# Patient Record
Sex: FEMALE | Race: WHITE | URBAN - METROPOLITAN AREA
[De-identification: names, ages, dates, MRNs, and addresses within clinical notes are randomized per-mention and may not be internally consistent; named-entity substitution may affect disease eponyms.]

---

## 2020-06-15 ENCOUNTER — LAB OUTSIDE AN ENCOUNTER (OUTPATIENT)
Dept: URBAN - METROPOLITAN AREA CLINIC 98 | Facility: CLINIC | Age: 65
End: 2020-06-15

## 2020-06-23 ENCOUNTER — OFFICE VISIT (OUTPATIENT)
Dept: URBAN - METROPOLITAN AREA TELEHEALTH 2 | Facility: TELEHEALTH | Age: 65
End: 2020-06-23
Payer: MEDICARE

## 2020-06-23 ENCOUNTER — DASHBOARD ENCOUNTERS (OUTPATIENT)
Age: 65
End: 2020-06-23

## 2020-06-23 DIAGNOSIS — K74.60 CIRRHOSIS: ICD-10-CM

## 2020-06-23 DIAGNOSIS — I85.00 ESOPHAGEAL VARICES: ICD-10-CM

## 2020-06-23 DIAGNOSIS — K75.81 NASH WITH CIRRHOSIS: ICD-10-CM

## 2020-06-23 DIAGNOSIS — K74.69 CIRRHOSIS, CRYPTOGENIC: ICD-10-CM

## 2020-06-23 PROCEDURE — 1036F TOBACCO NON-USER: CPT | Performed by: INTERNAL MEDICINE

## 2020-06-23 PROCEDURE — 3017F COLORECTAL CA SCREEN DOC REV: CPT | Performed by: INTERNAL MEDICINE

## 2020-06-23 PROCEDURE — G8417 CALC BMI ABV UP PARAM F/U: HCPCS | Performed by: INTERNAL MEDICINE

## 2020-06-23 PROCEDURE — 99214 OFFICE O/P EST MOD 30 MIN: CPT | Performed by: INTERNAL MEDICINE

## 2020-06-23 PROCEDURE — G9903 PT SCRN TBCO ID AS NON USER: HCPCS | Performed by: INTERNAL MEDICINE

## 2020-06-23 PROCEDURE — G8427 DOCREV CUR MEDS BY ELIG CLIN: HCPCS | Performed by: INTERNAL MEDICINE

## 2020-06-23 RX ORDER — PANTOPRAZOLE SODIUM 40 MG
TAKE 1 TABLET (40 MG) BY ORAL ROUTE ONCE DAILY FOR 90 DAYS TABLET, DELAYED RELEASE (ENTERIC COATED) ORAL 1
Qty: 90 | Refills: 3 | Status: ACTIVE | COMMUNITY
Start: 2020-01-29 | End: 2021-01-23

## 2020-06-23 RX ORDER — LEVOTHYROXINE SODIUM 75 UG/1
TABLET ORAL
Qty: 0 | Refills: 0 | Status: ACTIVE | COMMUNITY
Start: 1900-01-01

## 2020-06-23 RX ORDER — METFORMIN HYDROCHLORIDE 500 MG/1
TAKE 2 TABLETS (1,000 MG) BY ORAL ROUTE ONCE DAILY WITH THE EVENING MEAL TABLET, EXTENDED RELEASE ORAL 1
Qty: 0 | Refills: 0 | Status: ACTIVE | COMMUNITY
Start: 1900-01-01

## 2020-06-23 RX ORDER — INSULIN GLARGINE 100 [IU]/ML
INJECTION, SOLUTION SUBCUTANEOUS
Qty: 0 | Refills: 0 | Status: ACTIVE | COMMUNITY
Start: 1900-01-01

## 2020-06-23 RX ORDER — SITAGLIPTIN 50 MG/1
TAKE 1 TABLET (50 MG) BY ORAL ROUTE ONCE DAILY TABLET, FILM COATED ORAL 1
Qty: 0 | Refills: 0 | Status: ACTIVE | COMMUNITY
Start: 1900-01-01

## 2020-06-23 RX ORDER — NADOLOL 40 MG/1
TAKE 1 TABLET BY MOUTH ONCE DAILY AT BEDTIME TABLET ORAL
Qty: 90 | Refills: 3 | Status: ACTIVE | COMMUNITY
Start: 2019-12-11

## 2020-06-23 RX ORDER — GLIMEPIRIDE 2 MG/1
TAKE 2 TABLETS (4 MG) BY ORAL ROUTE ONCE DAILY TABLET ORAL 1
Qty: 0 | Refills: 0 | Status: ACTIVE | COMMUNITY
Start: 1900-01-01

## 2020-06-23 NOTE — HPI-TODAY'S VISIT:
Here to f/u: routine cirrhosis faxed labs - still pending my review blood counts are pretty low  [place] concerned that she is needing to get more iron infusions on a more frequent basis  Hb 9.2 no overt bleeding

## 2020-07-15 ENCOUNTER — TELEPHONE ENCOUNTER (OUTPATIENT)
Dept: URBAN - METROPOLITAN AREA CLINIC 92 | Facility: CLINIC | Age: 65
End: 2020-07-15

## 2020-07-16 ENCOUNTER — OFFICE VISIT (OUTPATIENT)
Dept: URBAN - METROPOLITAN AREA MEDICAL CENTER 28 | Facility: MEDICAL CENTER | Age: 65
End: 2020-07-16
Payer: MEDICARE

## 2020-07-16 ENCOUNTER — LAB OUTSIDE AN ENCOUNTER (OUTPATIENT)
Dept: URBAN - METROPOLITAN AREA CLINIC 98 | Facility: CLINIC | Age: 65
End: 2020-07-16

## 2020-07-16 DIAGNOSIS — I85.10 ESOPH VARICE OTHER DIS: ICD-10-CM

## 2020-07-16 DIAGNOSIS — K31.7 BENIGN GASTRIC POLYP: ICD-10-CM

## 2020-07-16 DIAGNOSIS — K29.60 ADENOPAPILLOMATOSIS GASTRICA: ICD-10-CM

## 2020-07-16 DIAGNOSIS — K74.60 ADVANCED CIRRHOSIS OF LIVER: ICD-10-CM

## 2020-07-16 LAB
ABSOLUTE NRBC COUNT: 0
GLUCOSE POC: 210
HCT: 23.8
HGB: 7.2
IPF: 2.2
MCH: 25
MCHC: 30.3
MCV: 82.6
MPV: 9.9
NRBC AUTO: 0
PERFORMING LAB: (no result)
PERFORMING LAB: (no result)
PLATELETS: 83
RBC: 2.88
RDW: 17.1
WBC: 2.6

## 2020-07-16 PROCEDURE — 43251 EGD REMOVE LESION SNARE: CPT | Performed by: INTERNAL MEDICINE

## 2020-07-16 RX ORDER — GLIMEPIRIDE 2 MG/1
TAKE 2 TABLETS (4 MG) BY ORAL ROUTE ONCE DAILY TABLET ORAL 1
Qty: 0 | Refills: 0 | Status: ACTIVE | COMMUNITY
Start: 1900-01-01

## 2020-07-16 RX ORDER — INSULIN GLARGINE 100 [IU]/ML
INJECTION, SOLUTION SUBCUTANEOUS
Qty: 0 | Refills: 0 | Status: ACTIVE | COMMUNITY
Start: 1900-01-01

## 2020-07-16 RX ORDER — NADOLOL 40 MG/1
TAKE 1 TABLET BY MOUTH ONCE DAILY AT BEDTIME TABLET ORAL
Qty: 90 | Refills: 3 | Status: ACTIVE | COMMUNITY
Start: 2019-12-11

## 2020-07-16 RX ORDER — SITAGLIPTIN 50 MG/1
TAKE 1 TABLET (50 MG) BY ORAL ROUTE ONCE DAILY TABLET, FILM COATED ORAL 1
Qty: 0 | Refills: 0 | Status: ACTIVE | COMMUNITY
Start: 1900-01-01

## 2020-07-16 RX ORDER — LEVOTHYROXINE SODIUM 75 UG/1
TABLET ORAL
Qty: 0 | Refills: 0 | Status: ACTIVE | COMMUNITY
Start: 1900-01-01

## 2020-07-16 RX ORDER — METFORMIN HYDROCHLORIDE 500 MG/1
TAKE 2 TABLETS (1,000 MG) BY ORAL ROUTE ONCE DAILY WITH THE EVENING MEAL TABLET, EXTENDED RELEASE ORAL 1
Qty: 0 | Refills: 0 | Status: ACTIVE | COMMUNITY
Start: 1900-01-01

## 2020-07-16 RX ORDER — PANTOPRAZOLE SODIUM 40 MG
TAKE 1 TABLET (40 MG) BY ORAL ROUTE ONCE DAILY FOR 90 DAYS TABLET, DELAYED RELEASE (ENTERIC COATED) ORAL 1
Qty: 90 | Refills: 3 | Status: ACTIVE | COMMUNITY
Start: 2020-01-29 | End: 2021-01-23

## 2020-09-16 ENCOUNTER — ERX REFILL RESPONSE (OUTPATIENT)
Age: 65
End: 2020-09-16

## 2020-09-16 RX ORDER — NADOLOL 40 MG/1
TAKE 1 TABLET BY MOUTH ONCE DAILY AT BEDTIME TABLET ORAL
Qty: 90 | Refills: 3

## 2020-09-16 RX ORDER — PANTOPRAZOLE SODIUM 40 MG/1
TAKE 1 TABLET BY MOUTH TWICE A DAY TABLET, DELAYED RELEASE ORAL
Qty: 180 | Refills: 3

## 2020-09-17 ENCOUNTER — OFFICE VISIT (OUTPATIENT)
Dept: URBAN - METROPOLITAN AREA MEDICAL CENTER 28 | Facility: MEDICAL CENTER | Age: 65
End: 2020-09-17
Payer: MEDICARE

## 2020-09-17 ENCOUNTER — LAB OUTSIDE AN ENCOUNTER (OUTPATIENT)
Dept: URBAN - METROPOLITAN AREA CLINIC 98 | Facility: CLINIC | Age: 65
End: 2020-09-17

## 2020-09-17 ENCOUNTER — TELEPHONE ENCOUNTER (OUTPATIENT)
Dept: URBAN - METROPOLITAN AREA CLINIC 98 | Facility: CLINIC | Age: 65
End: 2020-09-17

## 2020-09-17 DIAGNOSIS — K31.89 ACQUIRED DEFORMITY OF DUODENUM: ICD-10-CM

## 2020-09-17 DIAGNOSIS — I85.10 ESOPH VARICE OTHER DIS: ICD-10-CM

## 2020-09-17 DIAGNOSIS — K74.60 ADVANCED CIRRHOSIS OF LIVER: ICD-10-CM

## 2020-09-17 LAB
GLUCOSE POC: 235
PERFORMING LAB: (no result)

## 2020-09-17 PROCEDURE — 43244 EGD VARICES LIGATION: CPT | Performed by: INTERNAL MEDICINE

## 2020-09-17 RX ORDER — NADOLOL 40 MG/1
TAKE 1 TABLET BY MOUTH ONCE DAILY AT BEDTIME TABLET ORAL
Qty: 90 | Refills: 3

## 2020-09-17 RX ORDER — INSULIN GLARGINE 100 [IU]/ML
INJECTION, SOLUTION SUBCUTANEOUS
Qty: 0 | Refills: 0 | Status: ACTIVE | COMMUNITY
Start: 1900-01-01

## 2020-09-17 RX ORDER — LEVOTHYROXINE SODIUM 75 UG/1
TABLET ORAL
Qty: 0 | Refills: 0 | Status: ACTIVE | COMMUNITY
Start: 1900-01-01

## 2020-09-17 RX ORDER — PANTOPRAZOLE SODIUM 40 MG
TAKE 1 TABLET (40 MG) BY ORAL ROUTE ONCE DAILY FOR 90 DAYS TABLET, DELAYED RELEASE (ENTERIC COATED) ORAL 1
Qty: 90 | Refills: 3 | Status: ACTIVE | COMMUNITY
Start: 2020-01-29 | End: 2021-01-23

## 2020-09-17 RX ORDER — NADOLOL 40 MG/1
1 TABLET TABLET ORAL ONCE A DAY
Qty: 90 | Refills: 3

## 2020-09-17 RX ORDER — PANTOPRAZOLE SODIUM 40 MG
1 TABLET TABLET, DELAYED RELEASE (ENTERIC COATED) ORAL ONCE A DAY
Qty: 90 | Refills: 3

## 2020-09-17 RX ORDER — NADOLOL 40 MG/1
TAKE 1 TABLET BY MOUTH ONCE DAILY AT BEDTIME TABLET ORAL
Qty: 90 | Refills: 3 | Status: ACTIVE | COMMUNITY

## 2020-09-17 RX ORDER — SITAGLIPTIN 50 MG/1
TAKE 1 TABLET (50 MG) BY ORAL ROUTE ONCE DAILY TABLET, FILM COATED ORAL 1
Qty: 0 | Refills: 0 | Status: ACTIVE | COMMUNITY
Start: 1900-01-01

## 2020-09-17 RX ORDER — PANTOPRAZOLE SODIUM 40 MG
TAKE 1 TABLET (40 MG) BY ORAL ROUTE ONCE DAILY FOR 90 DAYS TABLET, DELAYED RELEASE (ENTERIC COATED) ORAL 1
Qty: 90 | Refills: 3
Start: 2020-01-29

## 2020-09-17 RX ORDER — GLIMEPIRIDE 2 MG/1
TAKE 2 TABLETS (4 MG) BY ORAL ROUTE ONCE DAILY TABLET ORAL 1
Qty: 0 | Refills: 0 | Status: ACTIVE | COMMUNITY
Start: 1900-01-01

## 2020-09-17 RX ORDER — METFORMIN HYDROCHLORIDE 500 MG/1
TAKE 2 TABLETS (1,000 MG) BY ORAL ROUTE ONCE DAILY WITH THE EVENING MEAL TABLET, EXTENDED RELEASE ORAL 1
Qty: 0 | Refills: 0 | Status: ACTIVE | COMMUNITY
Start: 1900-01-01

## 2020-09-17 RX ORDER — PANTOPRAZOLE SODIUM 40 MG/1
TAKE 1 TABLET BY MOUTH TWICE A DAY TABLET, DELAYED RELEASE ORAL
Qty: 180 | Refills: 3 | Status: ACTIVE | COMMUNITY

## 2020-12-03 ENCOUNTER — LAB OUTSIDE AN ENCOUNTER (OUTPATIENT)
Dept: URBAN - METROPOLITAN AREA CLINIC 98 | Facility: CLINIC | Age: 65
End: 2020-12-03

## 2020-12-07 ENCOUNTER — WEB ENCOUNTER (OUTPATIENT)
Dept: URBAN - METROPOLITAN AREA CLINIC 98 | Facility: CLINIC | Age: 65
End: 2020-12-07

## 2020-12-17 ENCOUNTER — LAB OUTSIDE AN ENCOUNTER (OUTPATIENT)
Dept: URBAN - METROPOLITAN AREA CLINIC 98 | Facility: CLINIC | Age: 65
End: 2020-12-17

## 2020-12-23 ENCOUNTER — LAB OUTSIDE AN ENCOUNTER (OUTPATIENT)
Dept: URBAN - METROPOLITAN AREA TELEHEALTH 2 | Facility: TELEHEALTH | Age: 65
End: 2020-12-23

## 2021-01-11 ENCOUNTER — OFFICE VISIT (OUTPATIENT)
Dept: URBAN - METROPOLITAN AREA MEDICAL CENTER 28 | Facility: MEDICAL CENTER | Age: 66
End: 2021-01-11

## 2021-01-20 ENCOUNTER — TELEPHONE ENCOUNTER (OUTPATIENT)
Dept: URBAN - METROPOLITAN AREA CLINIC 98 | Facility: CLINIC | Age: 66
End: 2021-01-20

## 2021-01-22 ENCOUNTER — OFFICE VISIT (OUTPATIENT)
Dept: URBAN - METROPOLITAN AREA MEDICAL CENTER 28 | Facility: MEDICAL CENTER | Age: 66
End: 2021-01-22
Payer: MEDICARE

## 2021-01-22 ENCOUNTER — LAB OUTSIDE AN ENCOUNTER (OUTPATIENT)
Dept: URBAN - METROPOLITAN AREA CLINIC 98 | Facility: CLINIC | Age: 66
End: 2021-01-22

## 2021-01-22 ENCOUNTER — TELEPHONE ENCOUNTER (OUTPATIENT)
Dept: URBAN - METROPOLITAN AREA CLINIC 98 | Facility: CLINIC | Age: 66
End: 2021-01-22

## 2021-01-22 DIAGNOSIS — I85.10 ESOPH VARICE OTHER DIS: ICD-10-CM

## 2021-01-22 DIAGNOSIS — K74.60 ADVANCED CIRRHOSIS OF LIVER: ICD-10-CM

## 2021-01-22 DIAGNOSIS — K31.89 ACQUIRED DEFORMITY OF DUODENUM: ICD-10-CM

## 2021-01-22 LAB
ABSOLUTE BASOPHIL COUNT: 0.03
ABSOLUTE EOSINOPHIL COUNT: 0.09
ABSOLUTE IMMATURE GRANULOCYTE  COUNT: 0.02
ABSOLUTE LYMPHOCYTE COUNT: 0.66
ABSOLUTE MONOCYTE COUNT: 0.21
ABSOLUTE NEUTROPHIL COUNT (ANC): 1.93
ABSOLUTE NRBC  COUNT: 0
BASOPHIL AUTO: 1
EOS AUTO: 3
GLUCOSE POC: 207
HCT: 26.7
HGB: 8.3
IMMATURE GRANULOCYTES AUTO: 0.7
IPF: 3
LYMPH AUTO: 22
MCH: 25.9
MCHC: 31.1
MCV: 83.4
MONO AUTO: 7
MPV: 10.8
NEUTRO AUTO: 66
NRBC AUTO: 0
PERFORMING LAB: (no result)
PLATELETS: 81
RBC: 3.2
RDW: 15.5
SLIDE REVIEW: (no result)
WBC: 2.9

## 2021-01-22 PROCEDURE — 43244 EGD VARICES LIGATION: CPT | Performed by: INTERNAL MEDICINE

## 2021-01-22 RX ORDER — PANTOPRAZOLE SODIUM 40 MG
1 TABLET TABLET, DELAYED RELEASE (ENTERIC COATED) ORAL ONCE A DAY
Qty: 90 | Refills: 3 | Status: ACTIVE | COMMUNITY

## 2021-01-22 RX ORDER — METFORMIN HYDROCHLORIDE 500 MG/1
TAKE 2 TABLETS (1,000 MG) BY ORAL ROUTE ONCE DAILY WITH THE EVENING MEAL TABLET, EXTENDED RELEASE ORAL 1
Qty: 0 | Refills: 0 | Status: ACTIVE | COMMUNITY
Start: 1900-01-01

## 2021-01-22 RX ORDER — LEVOTHYROXINE SODIUM 75 UG/1
TABLET ORAL
Qty: 0 | Refills: 0 | Status: ACTIVE | COMMUNITY
Start: 1900-01-01

## 2021-01-22 RX ORDER — NADOLOL 40 MG/1
1 TABLET TABLET ORAL ONCE A DAY
Qty: 90 | Refills: 3 | Status: ACTIVE | COMMUNITY

## 2021-01-22 RX ORDER — GLIMEPIRIDE 2 MG/1
TAKE 2 TABLETS (4 MG) BY ORAL ROUTE ONCE DAILY TABLET ORAL 1
Qty: 0 | Refills: 0 | Status: ACTIVE | COMMUNITY
Start: 1900-01-01

## 2021-01-22 RX ORDER — INSULIN GLARGINE 100 [IU]/ML
INJECTION, SOLUTION SUBCUTANEOUS
Qty: 0 | Refills: 0 | Status: ACTIVE | COMMUNITY
Start: 1900-01-01

## 2021-01-22 RX ORDER — SITAGLIPTIN 50 MG/1
TAKE 1 TABLET (50 MG) BY ORAL ROUTE ONCE DAILY TABLET, FILM COATED ORAL 1
Qty: 0 | Refills: 0 | Status: ACTIVE | COMMUNITY
Start: 1900-01-01

## 2021-01-22 RX ORDER — PANTOPRAZOLE SODIUM 40 MG/1
TAKE 1 TABLET BY MOUTH TWICE A DAY TABLET, DELAYED RELEASE ORAL
Qty: 180 | Refills: 3 | Status: ACTIVE | COMMUNITY

## 2021-03-10 ENCOUNTER — TELEPHONE ENCOUNTER (OUTPATIENT)
Dept: URBAN - METROPOLITAN AREA CLINIC 98 | Facility: CLINIC | Age: 66
End: 2021-03-10

## 2021-04-22 ENCOUNTER — LAB OUTSIDE AN ENCOUNTER (OUTPATIENT)
Dept: URBAN - METROPOLITAN AREA CLINIC 98 | Facility: CLINIC | Age: 66
End: 2021-04-22

## 2021-04-23 ENCOUNTER — OFFICE VISIT (OUTPATIENT)
Dept: URBAN - METROPOLITAN AREA MEDICAL CENTER 28 | Facility: MEDICAL CENTER | Age: 66
End: 2021-04-23
Payer: MEDICARE

## 2021-04-23 ENCOUNTER — TELEPHONE ENCOUNTER (OUTPATIENT)
Dept: URBAN - METROPOLITAN AREA CLINIC 98 | Facility: CLINIC | Age: 66
End: 2021-04-23

## 2021-04-23 ENCOUNTER — LAB OUTSIDE AN ENCOUNTER (OUTPATIENT)
Dept: URBAN - METROPOLITAN AREA CLINIC 98 | Facility: CLINIC | Age: 66
End: 2021-04-23

## 2021-04-23 DIAGNOSIS — D50.0 ANEMIA DUE TO BLOOD LOSS: ICD-10-CM

## 2021-04-23 DIAGNOSIS — I85.10 ESOPH VARICE OTHER DIS: ICD-10-CM

## 2021-04-23 DIAGNOSIS — K74.60 ADVANCED CIRRHOSIS OF LIVER: ICD-10-CM

## 2021-04-23 DIAGNOSIS — K31.7 BENIGN GASTRIC POLYP: ICD-10-CM

## 2021-04-23 LAB
GLUCOSE POC: 201
PERFORMING LAB: (no result)

## 2021-04-23 PROCEDURE — 43251 EGD REMOVE LESION SNARE: CPT | Performed by: INTERNAL MEDICINE

## 2021-04-23 PROCEDURE — 43244 EGD VARICES LIGATION: CPT | Performed by: INTERNAL MEDICINE

## 2021-04-23 RX ORDER — PANTOPRAZOLE SODIUM 40 MG/1
TAKE 1 TABLET BY MOUTH TWICE A DAY TABLET, DELAYED RELEASE ORAL
Qty: 180 | Refills: 3 | Status: ACTIVE | COMMUNITY

## 2021-04-23 RX ORDER — PANTOPRAZOLE SODIUM 40 MG
1 TABLET TABLET, DELAYED RELEASE (ENTERIC COATED) ORAL ONCE A DAY
Qty: 90 | Refills: 3 | Status: ACTIVE | COMMUNITY

## 2021-04-23 RX ORDER — METFORMIN HYDROCHLORIDE 500 MG/1
TAKE 2 TABLETS (1,000 MG) BY ORAL ROUTE ONCE DAILY WITH THE EVENING MEAL TABLET, EXTENDED RELEASE ORAL 1
Qty: 0 | Refills: 0 | Status: ACTIVE | COMMUNITY
Start: 1900-01-01

## 2021-04-23 RX ORDER — SITAGLIPTIN 50 MG/1
TAKE 1 TABLET (50 MG) BY ORAL ROUTE ONCE DAILY TABLET, FILM COATED ORAL 1
Qty: 0 | Refills: 0 | Status: ACTIVE | COMMUNITY
Start: 1900-01-01

## 2021-04-23 RX ORDER — GLIMEPIRIDE 2 MG/1
TAKE 2 TABLETS (4 MG) BY ORAL ROUTE ONCE DAILY TABLET ORAL 1
Qty: 0 | Refills: 0 | Status: ACTIVE | COMMUNITY
Start: 1900-01-01

## 2021-04-23 RX ORDER — NADOLOL 40 MG/1
1 TABLET TABLET ORAL ONCE A DAY
Qty: 90 | Refills: 3 | Status: ACTIVE | COMMUNITY

## 2021-04-23 RX ORDER — LEVOTHYROXINE SODIUM 75 UG/1
TABLET ORAL
Qty: 0 | Refills: 0 | Status: ACTIVE | COMMUNITY
Start: 1900-01-01

## 2021-04-23 RX ORDER — INSULIN GLARGINE 100 [IU]/ML
INJECTION, SOLUTION SUBCUTANEOUS
Qty: 0 | Refills: 0 | Status: ACTIVE | COMMUNITY
Start: 1900-01-01

## 2021-04-29 ENCOUNTER — WEB ENCOUNTER (OUTPATIENT)
Dept: URBAN - METROPOLITAN AREA CLINIC 98 | Facility: CLINIC | Age: 66
End: 2021-04-29

## 2021-05-04 ENCOUNTER — OFFICE VISIT (OUTPATIENT)
Dept: URBAN - METROPOLITAN AREA TELEHEALTH 2 | Facility: TELEHEALTH | Age: 66
End: 2021-05-04

## 2021-06-17 ENCOUNTER — LAB OUTSIDE AN ENCOUNTER (OUTPATIENT)
Dept: URBAN - METROPOLITAN AREA CLINIC 98 | Facility: CLINIC | Age: 66
End: 2021-06-17

## 2021-06-18 ENCOUNTER — WEB ENCOUNTER (OUTPATIENT)
Dept: URBAN - METROPOLITAN AREA CLINIC 98 | Facility: CLINIC | Age: 66
End: 2021-06-18

## 2021-07-19 ENCOUNTER — TELEPHONE ENCOUNTER (OUTPATIENT)
Dept: URBAN - METROPOLITAN AREA CLINIC 98 | Facility: CLINIC | Age: 66
End: 2021-07-19

## 2021-07-23 ENCOUNTER — LAB OUTSIDE AN ENCOUNTER (OUTPATIENT)
Dept: URBAN - METROPOLITAN AREA CLINIC 98 | Facility: CLINIC | Age: 66
End: 2021-07-23

## 2021-07-23 ENCOUNTER — OFFICE VISIT (OUTPATIENT)
Dept: URBAN - METROPOLITAN AREA MEDICAL CENTER 28 | Facility: MEDICAL CENTER | Age: 66
End: 2021-07-23
Payer: MEDICARE

## 2021-07-23 DIAGNOSIS — I85.10 ESOPH VARICE OTHER DIS: ICD-10-CM

## 2021-07-23 DIAGNOSIS — K76.6 CLINICALLY SIGNIFICANT PORTAL HYPERTENSION: ICD-10-CM

## 2021-07-23 DIAGNOSIS — D50.9 ANEMIA, IRON DEFICIENCY: ICD-10-CM

## 2021-07-23 DIAGNOSIS — K74.60 ADVANCED CIRRHOSIS OF LIVER: ICD-10-CM

## 2021-07-23 LAB
GLUCOSE POC: 176
PERFORMING LAB: (no result)

## 2021-07-23 PROCEDURE — 43244 EGD VARICES LIGATION: CPT | Performed by: INTERNAL MEDICINE

## 2021-08-19 ENCOUNTER — OFFICE VISIT (OUTPATIENT)
Dept: URBAN - METROPOLITAN AREA MEDICAL CENTER 28 | Facility: MEDICAL CENTER | Age: 66
End: 2021-08-19

## 2021-08-23 ENCOUNTER — LAB OUTSIDE AN ENCOUNTER (OUTPATIENT)
Dept: URBAN - METROPOLITAN AREA CLINIC 98 | Facility: CLINIC | Age: 66
End: 2021-08-23

## 2021-09-24 ENCOUNTER — LAB OUTSIDE AN ENCOUNTER (OUTPATIENT)
Dept: URBAN - METROPOLITAN AREA CLINIC 98 | Facility: CLINIC | Age: 66
End: 2021-09-24

## 2021-09-24 ENCOUNTER — TELEPHONE ENCOUNTER (OUTPATIENT)
Dept: URBAN - METROPOLITAN AREA CLINIC 98 | Facility: CLINIC | Age: 66
End: 2021-09-24

## 2021-09-24 ENCOUNTER — OFFICE VISIT (OUTPATIENT)
Dept: URBAN - METROPOLITAN AREA MEDICAL CENTER 28 | Facility: MEDICAL CENTER | Age: 66
End: 2021-09-24
Payer: MEDICARE

## 2021-09-24 DIAGNOSIS — D50.9 ANEMIA: ICD-10-CM

## 2021-09-24 DIAGNOSIS — K31.89 ACQUIRED DEFORMITY OF DUODENUM: ICD-10-CM

## 2021-09-24 DIAGNOSIS — T18.2XXA FOREIGN BODY IN STOMACH: ICD-10-CM

## 2021-09-24 LAB
GLUCOSE POC: 203
GLUCOSE POC: 240
PERFORMING LAB: (no result)
PERFORMING LAB: (no result)

## 2021-09-24 PROCEDURE — 43251 EGD REMOVE LESION SNARE: CPT | Performed by: INTERNAL MEDICINE

## 2021-09-24 RX ORDER — GLIMEPIRIDE 2 MG/1
TAKE 2 TABLETS (4 MG) BY ORAL ROUTE ONCE DAILY TABLET ORAL 1
Qty: 0 | Refills: 0 | Status: ACTIVE | COMMUNITY
Start: 1900-01-01

## 2021-09-24 RX ORDER — INSULIN GLARGINE 100 [IU]/ML
INJECTION, SOLUTION SUBCUTANEOUS
Qty: 0 | Refills: 0 | Status: ACTIVE | COMMUNITY
Start: 1900-01-01

## 2021-09-24 RX ORDER — METFORMIN HYDROCHLORIDE 500 MG/1
TAKE 2 TABLETS (1,000 MG) BY ORAL ROUTE ONCE DAILY WITH THE EVENING MEAL TABLET, EXTENDED RELEASE ORAL 1
Qty: 0 | Refills: 0 | Status: ACTIVE | COMMUNITY
Start: 1900-01-01

## 2021-09-24 RX ORDER — SITAGLIPTIN 50 MG/1
TAKE 1 TABLET (50 MG) BY ORAL ROUTE ONCE DAILY TABLET, FILM COATED ORAL 1
Qty: 0 | Refills: 0 | Status: ACTIVE | COMMUNITY
Start: 1900-01-01

## 2021-09-24 RX ORDER — LEVOTHYROXINE SODIUM 75 UG/1
TABLET ORAL
Qty: 0 | Refills: 0 | Status: ACTIVE | COMMUNITY
Start: 1900-01-01

## 2021-09-24 RX ORDER — PANTOPRAZOLE SODIUM 40 MG/1
TAKE 1 TABLET BY MOUTH TWICE A DAY TABLET, DELAYED RELEASE ORAL
Qty: 180 | Refills: 3 | Status: ACTIVE | COMMUNITY

## 2021-09-24 RX ORDER — NADOLOL 40 MG/1
1 TABLET TABLET ORAL ONCE A DAY
Qty: 90 | Refills: 3 | Status: ACTIVE | COMMUNITY

## 2021-09-24 RX ORDER — PANTOPRAZOLE SODIUM 40 MG
1 TABLET TABLET, DELAYED RELEASE (ENTERIC COATED) ORAL ONCE A DAY
Qty: 90 | Refills: 3 | Status: ACTIVE | COMMUNITY

## 2021-09-29 ENCOUNTER — WEB ENCOUNTER (OUTPATIENT)
Dept: URBAN - METROPOLITAN AREA CLINIC 98 | Facility: CLINIC | Age: 66
End: 2021-09-29

## 2021-10-21 ENCOUNTER — ERX REFILL RESPONSE (OUTPATIENT)
Dept: URBAN - METROPOLITAN AREA CLINIC 98 | Facility: CLINIC | Age: 66
End: 2021-10-21

## 2021-10-21 RX ORDER — NADOLOL 40 MG/1
TAKE 1 TABLET BY MOUTH EVERY DAY TABLET ORAL
Qty: 90 TABLET | Refills: 3 | OUTPATIENT

## 2021-10-21 RX ORDER — PANTOPRAZOLE SODIUM 40 MG/1
TAKE 1 TABLET BY MOUTH TWICE A DAY TABLET, DELAYED RELEASE ORAL
Qty: 180 | Refills: 3 | OUTPATIENT

## 2021-10-21 RX ORDER — PANTOPRAZOLE SODIUM 40 MG/1
TAKE 1 TABLET BY MOUTH TWICE A DAY TABLET, DELAYED RELEASE ORAL
Qty: 180 TABLET | Refills: 3 | OUTPATIENT

## 2021-12-16 ENCOUNTER — TELEPHONE ENCOUNTER (OUTPATIENT)
Dept: URBAN - METROPOLITAN AREA CLINIC 98 | Facility: CLINIC | Age: 66
End: 2021-12-16

## 2021-12-16 ENCOUNTER — LAB OUTSIDE AN ENCOUNTER (OUTPATIENT)
Dept: URBAN - METROPOLITAN AREA CLINIC 98 | Facility: CLINIC | Age: 66
End: 2021-12-16

## 2021-12-16 ENCOUNTER — OFFICE VISIT (OUTPATIENT)
Dept: URBAN - METROPOLITAN AREA MEDICAL CENTER 28 | Facility: MEDICAL CENTER | Age: 66
End: 2021-12-16
Payer: MEDICARE

## 2021-12-16 DIAGNOSIS — K76.6 CLINICALLY SIGNIFICANT PORTAL HYPERTENSION: ICD-10-CM

## 2021-12-16 DIAGNOSIS — K31.7 BENIGN GASTRIC POLYP: ICD-10-CM

## 2021-12-16 DIAGNOSIS — I85.10 ESOPH VARICE OTHER DIS: ICD-10-CM

## 2021-12-16 DIAGNOSIS — D50.9 ANEMIA: ICD-10-CM

## 2021-12-16 LAB
GLUCOSE POC: 200
PERFORMING LAB: (no result)

## 2021-12-16 PROCEDURE — 43251 EGD REMOVE LESION SNARE: CPT | Performed by: INTERNAL MEDICINE

## 2021-12-16 RX ORDER — PANTOPRAZOLE SODIUM 40 MG
1 TABLET TABLET, DELAYED RELEASE (ENTERIC COATED) ORAL ONCE A DAY
Qty: 90 | Refills: 3 | Status: ACTIVE | COMMUNITY

## 2021-12-16 RX ORDER — NADOLOL 40 MG/1
TAKE 1 TABLET BY MOUTH EVERY DAY TABLET ORAL
Qty: 90 TABLET | Refills: 3 | Status: ACTIVE | COMMUNITY

## 2021-12-16 RX ORDER — LEVOTHYROXINE SODIUM 75 UG/1
TABLET ORAL
Qty: 0 | Refills: 0 | Status: ACTIVE | COMMUNITY
Start: 1900-01-01

## 2021-12-16 RX ORDER — PANTOPRAZOLE SODIUM 40 MG/1
TAKE 1 TABLET BY MOUTH TWICE A DAY TABLET, DELAYED RELEASE ORAL
Qty: 180 TABLET | Refills: 3 | Status: ACTIVE | COMMUNITY

## 2021-12-16 RX ORDER — INSULIN GLARGINE 100 [IU]/ML
INJECTION, SOLUTION SUBCUTANEOUS
Qty: 0 | Refills: 0 | Status: ACTIVE | COMMUNITY
Start: 1900-01-01

## 2021-12-16 RX ORDER — METFORMIN HYDROCHLORIDE 500 MG/1
TAKE 2 TABLETS (1,000 MG) BY ORAL ROUTE ONCE DAILY WITH THE EVENING MEAL TABLET, EXTENDED RELEASE ORAL 1
Qty: 0 | Refills: 0 | Status: ACTIVE | COMMUNITY
Start: 1900-01-01

## 2021-12-16 RX ORDER — SITAGLIPTIN 50 MG/1
TAKE 1 TABLET (50 MG) BY ORAL ROUTE ONCE DAILY TABLET, FILM COATED ORAL 1
Qty: 0 | Refills: 0 | Status: ACTIVE | COMMUNITY
Start: 1900-01-01

## 2021-12-16 RX ORDER — GLIMEPIRIDE 2 MG/1
TAKE 2 TABLETS (4 MG) BY ORAL ROUTE ONCE DAILY TABLET ORAL 1
Qty: 0 | Refills: 0 | Status: ACTIVE | COMMUNITY
Start: 1900-01-01

## 2021-12-23 ENCOUNTER — LAB OUTSIDE AN ENCOUNTER (OUTPATIENT)
Dept: URBAN - METROPOLITAN AREA CLINIC 98 | Facility: CLINIC | Age: 66
End: 2021-12-23

## 2021-12-24 ENCOUNTER — LAB OUTSIDE AN ENCOUNTER (OUTPATIENT)
Dept: URBAN - METROPOLITAN AREA CLINIC 98 | Facility: CLINIC | Age: 66
End: 2021-12-24

## 2021-12-28 ENCOUNTER — WEB ENCOUNTER (OUTPATIENT)
Dept: URBAN - METROPOLITAN AREA CLINIC 98 | Facility: CLINIC | Age: 66
End: 2021-12-28

## 2022-03-16 ENCOUNTER — LAB OUTSIDE AN ENCOUNTER (OUTPATIENT)
Dept: URBAN - METROPOLITAN AREA CLINIC 98 | Facility: CLINIC | Age: 67
End: 2022-03-16

## 2022-03-25 ENCOUNTER — LAB OUTSIDE AN ENCOUNTER (OUTPATIENT)
Dept: URBAN - METROPOLITAN AREA CLINIC 98 | Facility: CLINIC | Age: 67
End: 2022-03-25

## 2022-03-25 ENCOUNTER — TELEPHONE ENCOUNTER (OUTPATIENT)
Dept: URBAN - METROPOLITAN AREA CLINIC 98 | Facility: CLINIC | Age: 67
End: 2022-03-25

## 2022-03-25 ENCOUNTER — OFFICE VISIT (OUTPATIENT)
Dept: URBAN - METROPOLITAN AREA MEDICAL CENTER 28 | Facility: MEDICAL CENTER | Age: 67
End: 2022-03-25
Payer: MEDICARE

## 2022-03-25 DIAGNOSIS — I85.10 ESOPH VARICE OTHER DIS: ICD-10-CM

## 2022-03-25 DIAGNOSIS — K31.89 ACQUIRED DEFORMITY OF DUODENUM: ICD-10-CM

## 2022-03-25 DIAGNOSIS — K74.60 ADVANCED CIRRHOSIS: ICD-10-CM

## 2022-03-25 LAB
ABSOLUTE BASOPHIL COUNT: 0.05
ABSOLUTE EOSINOPHIL COUNT: 0.15
ABSOLUTE IMMATURE GRANULOCYTE  COUNT: 0.03
ABSOLUTE LYMPHOCYTE COUNT: 0.58
ABSOLUTE MONOCYTE COUNT: 0.41
ABSOLUTE NEUTROPHIL COUNT (ANC): 2.81
ABSOLUTE NRBC  COUNT: 0
AG RATIO: 1
ALBUMIN LEVEL: 3.5
ALK PHOS: 168
ALT: 23
ANION GAP: 10
AST: 38
BASOPHIL AUTO: 1
BILIRUBIN TOTAL: 2.6
BUN/CREAT RATIO: 12
BUN: 11
CALCIUM LEVEL: 8.4
CHLORIDE LEVEL: 110
CO2 LEVEL: 22
CREATININE LEVEL: 0.9
EOS AUTO: 4
FERRITIN LEVEL: 265.9
GFR AFRICAN AMERICAN: >60
GFR NON AFRICAN AMERICAN: >60
GLUCOSE LEVEL: 181
GLUCOSE POC: 172
HCT: 31.5
HGB: 10.2
IMMATURE GRANULOCYTES AUTO: 0.7
INR: 1.4
IPF: 3
IRON LEVEL: 39
IRON SAT: 15
LYMPH AUTO: 14
MCH: 30
MCHC: 32.4
MCV: 92.6
MONO AUTO: 10
MPV: 10.2
NEUTRO AUTO: 70
NRBC AUTO: 0
OSMO (CALC): 287
PERFORMING LAB: (no result)
PLATELETS: 92
POTASSIUM LEVEL: 3.9
PROTEIN TOTAL: 7.1
PT: 16.1
RBC: 3.4
RDW: 17.8
SLIDE REVIEW: (no result)
SODIUM LEVEL: 142
TIBC: 258
WBC: 4

## 2022-03-25 PROCEDURE — 43244 EGD VARICES LIGATION: CPT | Performed by: INTERNAL MEDICINE

## 2022-03-25 PROCEDURE — 43239 EGD BIOPSY SINGLE/MULTIPLE: CPT | Performed by: INTERNAL MEDICINE

## 2022-03-25 RX ORDER — PANTOPRAZOLE SODIUM 40 MG/1
TAKE 1 TABLET BY MOUTH TWICE A DAY TABLET, DELAYED RELEASE ORAL
Qty: 180 TABLET | Refills: 3 | Status: ACTIVE | COMMUNITY

## 2022-03-25 RX ORDER — LEVOTHYROXINE SODIUM 75 UG/1
TABLET ORAL
Qty: 0 | Refills: 0 | Status: ACTIVE | COMMUNITY
Start: 1900-01-01

## 2022-03-25 RX ORDER — NADOLOL 40 MG/1
TAKE 1 TABLET BY MOUTH EVERY DAY TABLET ORAL
Qty: 90 TABLET | Refills: 3 | Status: ACTIVE | COMMUNITY

## 2022-03-25 RX ORDER — PANTOPRAZOLE SODIUM 40 MG
1 TABLET TABLET, DELAYED RELEASE (ENTERIC COATED) ORAL ONCE A DAY
Qty: 90 | Refills: 3 | Status: ACTIVE | COMMUNITY

## 2022-03-25 RX ORDER — METFORMIN HYDROCHLORIDE 500 MG/1
TAKE 2 TABLETS (1,000 MG) BY ORAL ROUTE ONCE DAILY WITH THE EVENING MEAL TABLET, EXTENDED RELEASE ORAL 1
Qty: 0 | Refills: 0 | Status: ACTIVE | COMMUNITY
Start: 1900-01-01

## 2022-03-25 RX ORDER — INSULIN GLARGINE 100 [IU]/ML
INJECTION, SOLUTION SUBCUTANEOUS
Qty: 0 | Refills: 0 | Status: ACTIVE | COMMUNITY
Start: 1900-01-01

## 2022-03-25 RX ORDER — SITAGLIPTIN 50 MG/1
TAKE 1 TABLET (50 MG) BY ORAL ROUTE ONCE DAILY TABLET, FILM COATED ORAL 1
Qty: 0 | Refills: 0 | Status: ACTIVE | COMMUNITY
Start: 1900-01-01

## 2022-03-25 RX ORDER — GLIMEPIRIDE 2 MG/1
TAKE 2 TABLETS (4 MG) BY ORAL ROUTE ONCE DAILY TABLET ORAL 1
Qty: 0 | Refills: 0 | Status: ACTIVE | COMMUNITY
Start: 1900-01-01

## 2022-03-28 ENCOUNTER — TELEPHONE ENCOUNTER (OUTPATIENT)
Dept: URBAN - METROPOLITAN AREA CLINIC 97 | Facility: CLINIC | Age: 67
End: 2022-03-28

## 2022-06-06 ENCOUNTER — LAB OUTSIDE AN ENCOUNTER (OUTPATIENT)
Dept: URBAN - METROPOLITAN AREA CLINIC 98 | Facility: CLINIC | Age: 67
End: 2022-06-06

## 2022-06-09 ENCOUNTER — LAB OUTSIDE AN ENCOUNTER (OUTPATIENT)
Dept: URBAN - METROPOLITAN AREA CLINIC 98 | Facility: CLINIC | Age: 67
End: 2022-06-09

## 2022-06-09 LAB
CREATININE POC: 0.6
PERFORMING LAB: (no result)

## 2022-06-10 ENCOUNTER — WEB ENCOUNTER (OUTPATIENT)
Dept: URBAN - METROPOLITAN AREA CLINIC 98 | Facility: CLINIC | Age: 67
End: 2022-06-10

## 2022-06-20 ENCOUNTER — LAB OUTSIDE AN ENCOUNTER (OUTPATIENT)
Dept: URBAN - METROPOLITAN AREA CLINIC 98 | Facility: CLINIC | Age: 67
End: 2022-06-20

## 2022-06-25 ENCOUNTER — LAB OUTSIDE AN ENCOUNTER (OUTPATIENT)
Dept: URBAN - METROPOLITAN AREA CLINIC 98 | Facility: CLINIC | Age: 67
End: 2022-06-25

## 2022-06-30 ENCOUNTER — ERX REFILL RESPONSE (OUTPATIENT)
Dept: URBAN - METROPOLITAN AREA CLINIC 98 | Facility: CLINIC | Age: 67
End: 2022-06-30

## 2022-06-30 RX ORDER — NADOLOL 40 MG/1
TAKE 1 TABLET BY MOUTH EVERY DAY TABLET ORAL
Qty: 90 TABLET | Refills: 1 | OUTPATIENT

## 2022-06-30 RX ORDER — PANTOPRAZOLE SODIUM 40 MG/1
TAKE 1 TABLET BY MOUTH TWICE A DAY TABLET, DELAYED RELEASE ORAL
Qty: 180 TABLET | Refills: 3 | OUTPATIENT

## 2022-06-30 RX ORDER — NADOLOL 40 MG/1
TAKE 1 TABLET BY MOUTH EVERY DAY TABLET ORAL
Qty: 90 TABLET | Refills: 3 | OUTPATIENT

## 2022-06-30 RX ORDER — PANTOPRAZOLE SODIUM 40 MG/1
TAKE 1 TABLET BY MOUTH TWICE A DAY TABLET, DELAYED RELEASE ORAL
Qty: 180 TABLET | Refills: 1 | OUTPATIENT

## 2022-07-22 ENCOUNTER — LAB OUTSIDE AN ENCOUNTER (OUTPATIENT)
Dept: URBAN - METROPOLITAN AREA CLINIC 98 | Facility: CLINIC | Age: 67
End: 2022-07-22

## 2022-07-22 ENCOUNTER — OFFICE VISIT (OUTPATIENT)
Dept: URBAN - METROPOLITAN AREA MEDICAL CENTER 28 | Facility: MEDICAL CENTER | Age: 67
End: 2022-07-22
Payer: MEDICARE

## 2022-07-22 DIAGNOSIS — K74.60 ADVANCED CIRRHOSIS: ICD-10-CM

## 2022-07-22 DIAGNOSIS — D50.0 ANEMIA: ICD-10-CM

## 2022-07-22 DIAGNOSIS — K31.7 BENIGN GASTRIC POLYP: ICD-10-CM

## 2022-07-22 DIAGNOSIS — I85.10 ESOPH VARICE OTHER DIS: ICD-10-CM

## 2022-07-22 LAB
GLUCOSE POC: 129
PERFORMING LAB: (no result)

## 2022-07-22 PROCEDURE — 43251 EGD REMOVE LESION SNARE: CPT | Performed by: INTERNAL MEDICINE

## 2022-07-22 RX ORDER — PANTOPRAZOLE SODIUM 40 MG
1 TABLET TABLET, DELAYED RELEASE (ENTERIC COATED) ORAL ONCE A DAY
Qty: 90 | Refills: 3 | Status: ACTIVE | COMMUNITY

## 2022-07-22 RX ORDER — PANTOPRAZOLE SODIUM 40 MG/1
TAKE 1 TABLET BY MOUTH TWICE A DAY TABLET, DELAYED RELEASE ORAL
Qty: 180 TABLET | Refills: 1 | Status: ACTIVE | COMMUNITY

## 2022-07-22 RX ORDER — SITAGLIPTIN 50 MG/1
TAKE 1 TABLET (50 MG) BY ORAL ROUTE ONCE DAILY TABLET, FILM COATED ORAL 1
Qty: 0 | Refills: 0 | Status: ACTIVE | COMMUNITY
Start: 1900-01-01

## 2022-07-22 RX ORDER — GLIMEPIRIDE 2 MG/1
TAKE 2 TABLETS (4 MG) BY ORAL ROUTE ONCE DAILY TABLET ORAL 1
Qty: 0 | Refills: 0 | Status: ACTIVE | COMMUNITY
Start: 1900-01-01

## 2022-07-22 RX ORDER — LEVOTHYROXINE SODIUM 75 UG/1
TABLET ORAL
Qty: 0 | Refills: 0 | Status: ACTIVE | COMMUNITY
Start: 1900-01-01

## 2022-07-22 RX ORDER — NADOLOL 40 MG/1
TAKE 1 TABLET BY MOUTH EVERY DAY TABLET ORAL
Qty: 90 TABLET | Refills: 1 | Status: ACTIVE | COMMUNITY

## 2022-07-22 RX ORDER — METFORMIN HYDROCHLORIDE 500 MG/1
TAKE 2 TABLETS (1,000 MG) BY ORAL ROUTE ONCE DAILY WITH THE EVENING MEAL TABLET, EXTENDED RELEASE ORAL 1
Qty: 0 | Refills: 0 | Status: ACTIVE | COMMUNITY
Start: 1900-01-01

## 2022-07-22 RX ORDER — INSULIN GLARGINE 100 [IU]/ML
INJECTION, SOLUTION SUBCUTANEOUS
Qty: 0 | Refills: 0 | Status: ACTIVE | COMMUNITY
Start: 1900-01-01

## 2022-07-26 ENCOUNTER — TELEPHONE ENCOUNTER (OUTPATIENT)
Dept: URBAN - METROPOLITAN AREA CLINIC 98 | Facility: CLINIC | Age: 67
End: 2022-07-26

## 2022-07-26 RX ORDER — INSULIN GLARGINE 100 [IU]/ML
INJECTION, SOLUTION SUBCUTANEOUS
Qty: 0 | Refills: 0 | Status: ACTIVE | COMMUNITY
Start: 1900-01-01

## 2022-07-26 RX ORDER — PANTOPRAZOLE SODIUM 40 MG/1
TAKE 1 TABLET BY MOUTH TWICE A DAY TABLET, DELAYED RELEASE ORAL
Qty: 180 TABLET | Refills: 1 | Status: ACTIVE | COMMUNITY

## 2022-07-26 RX ORDER — LEVOTHYROXINE SODIUM 75 UG/1
TABLET ORAL
Qty: 0 | Refills: 0 | Status: ACTIVE | COMMUNITY
Start: 1900-01-01

## 2022-07-26 RX ORDER — SITAGLIPTIN 50 MG/1
TAKE 1 TABLET (50 MG) BY ORAL ROUTE ONCE DAILY TABLET, FILM COATED ORAL 1
Qty: 0 | Refills: 0 | Status: ACTIVE | COMMUNITY
Start: 1900-01-01

## 2022-07-26 RX ORDER — PANTOPRAZOLE SODIUM 40 MG
1 TABLET TABLET, DELAYED RELEASE (ENTERIC COATED) ORAL ONCE A DAY
Qty: 90 | Refills: 3 | Status: ACTIVE | COMMUNITY

## 2022-07-26 RX ORDER — METFORMIN HYDROCHLORIDE 500 MG/1
TAKE 2 TABLETS (1,000 MG) BY ORAL ROUTE ONCE DAILY WITH THE EVENING MEAL TABLET, EXTENDED RELEASE ORAL 1
Qty: 0 | Refills: 0 | Status: ACTIVE | COMMUNITY
Start: 1900-01-01

## 2022-07-26 RX ORDER — NADOLOL 40 MG/1
TAKE 1 TABLET BY MOUTH EVERY DAY TABLET ORAL
Qty: 90 TABLET | Refills: 1 | Status: ACTIVE | COMMUNITY

## 2022-07-26 RX ORDER — GLIMEPIRIDE 2 MG/1
TAKE 2 TABLETS (4 MG) BY ORAL ROUTE ONCE DAILY TABLET ORAL 1
Qty: 0 | Refills: 0 | Status: ACTIVE | COMMUNITY
Start: 1900-01-01

## 2022-07-26 RX ORDER — SODIUM, POTASSIUM,MAG SULFATES 17.5-3.13G
177ML SOLUTION, RECONSTITUTED, ORAL ORAL AS DIRECTED
Qty: 1 KIT | Refills: 0 | OUTPATIENT
Start: 2022-07-29 | End: 2022-07-30

## 2022-07-27 ENCOUNTER — WEB ENCOUNTER (OUTPATIENT)
Dept: URBAN - METROPOLITAN AREA CLINIC 98 | Facility: CLINIC | Age: 67
End: 2022-07-27

## 2022-07-29 PROBLEM — 428283002: Status: ACTIVE | Noted: 2022-07-29

## 2022-07-31 PROBLEM — 255417007 CIRRHOTIC: Status: ACTIVE | Noted: 2020-06-21

## 2022-07-31 PROBLEM — 17709002 ESOPHAGEAL VARICES WITH BLEEDING: Status: ACTIVE | Noted: 2020-06-21

## 2022-11-08 ENCOUNTER — LAB OUTSIDE AN ENCOUNTER (OUTPATIENT)
Dept: URBAN - METROPOLITAN AREA CLINIC 98 | Facility: CLINIC | Age: 67
End: 2022-11-08

## 2022-11-08 ENCOUNTER — TELEPHONE ENCOUNTER (OUTPATIENT)
Dept: URBAN - METROPOLITAN AREA CLINIC 98 | Facility: CLINIC | Age: 67
End: 2022-11-08

## 2022-11-08 PROBLEM — 408512008 BODY MASS INDEX 40+ - MORBIDLY OBESE: Status: ACTIVE | Noted: 2020-06-23

## 2022-11-17 ENCOUNTER — OFFICE VISIT (OUTPATIENT)
Dept: URBAN - METROPOLITAN AREA MEDICAL CENTER 28 | Facility: MEDICAL CENTER | Age: 67
End: 2022-11-17
Payer: MEDICARE

## 2022-11-17 ENCOUNTER — LAB OUTSIDE AN ENCOUNTER (OUTPATIENT)
Dept: URBAN - METROPOLITAN AREA CLINIC 98 | Facility: CLINIC | Age: 67
End: 2022-11-17

## 2022-11-17 DIAGNOSIS — I85.10 ESOPH VARICE OTHER DIS: ICD-10-CM

## 2022-11-17 DIAGNOSIS — D12.3 ADENOMA OF TRANSVERSE COLON: ICD-10-CM

## 2022-11-17 DIAGNOSIS — K31.7 BENIGN GASTRIC POLYP: ICD-10-CM

## 2022-11-17 DIAGNOSIS — D50.9 ANEMIA: ICD-10-CM

## 2022-11-17 DIAGNOSIS — K74.60 ADVANCED CIRRHOSIS: ICD-10-CM

## 2022-11-17 DIAGNOSIS — Z86.010 ADENOMAS PERSONAL HISTORY OF COLONIC POLYPS: ICD-10-CM

## 2022-11-17 DIAGNOSIS — K62.1 ANAL AND RECTAL POLYP: ICD-10-CM

## 2022-11-17 LAB
GLUCOSE POC: 111
PERFORMING LAB: (no result)

## 2022-11-17 PROCEDURE — 43251 EGD REMOVE LESION SNARE: CPT | Performed by: INTERNAL MEDICINE

## 2022-11-17 PROCEDURE — 45380 COLONOSCOPY AND BIOPSY: CPT | Performed by: INTERNAL MEDICINE

## 2022-11-26 ENCOUNTER — LAB OUTSIDE AN ENCOUNTER (OUTPATIENT)
Dept: URBAN - METROPOLITAN AREA CLINIC 98 | Facility: CLINIC | Age: 67
End: 2022-11-26

## 2022-12-01 ENCOUNTER — TELEPHONE ENCOUNTER (OUTPATIENT)
Dept: URBAN - METROPOLITAN AREA CLINIC 98 | Facility: CLINIC | Age: 67
End: 2022-12-01

## 2022-12-01 RX ORDER — PANTOPRAZOLE SODIUM 40 MG/1
TAKE 1 TABLET BY MOUTH TWICE A DAY TABLET, DELAYED RELEASE ORAL
Qty: 180 TABLET | Refills: 3

## 2022-12-01 RX ORDER — NADOLOL 40 MG/1
TAKE 1 TABLET BY MOUTH EVERY DAY TABLET ORAL
Qty: 90 TABLET | Refills: 3

## 2022-12-02 ENCOUNTER — LAB OUTSIDE AN ENCOUNTER (OUTPATIENT)
Dept: URBAN - METROPOLITAN AREA CLINIC 98 | Facility: CLINIC | Age: 67
End: 2022-12-02

## 2022-12-02 ENCOUNTER — TELEPHONE ENCOUNTER (OUTPATIENT)
Dept: URBAN - METROPOLITAN AREA CLINIC 98 | Facility: CLINIC | Age: 67
End: 2022-12-02

## 2022-12-12 ENCOUNTER — LAB OUTSIDE AN ENCOUNTER (OUTPATIENT)
Dept: URBAN - METROPOLITAN AREA CLINIC 98 | Facility: CLINIC | Age: 67
End: 2022-12-12

## 2022-12-14 ENCOUNTER — TELEPHONE ENCOUNTER (OUTPATIENT)
Dept: URBAN - METROPOLITAN AREA CLINIC 98 | Facility: CLINIC | Age: 67
End: 2022-12-14

## 2022-12-20 ENCOUNTER — TELEPHONE ENCOUNTER (OUTPATIENT)
Dept: URBAN - METROPOLITAN AREA CLINIC 98 | Facility: CLINIC | Age: 67
End: 2022-12-20

## 2023-02-08 ENCOUNTER — TELEPHONE ENCOUNTER (OUTPATIENT)
Dept: URBAN - METROPOLITAN AREA CLINIC 98 | Facility: CLINIC | Age: 68
End: 2023-02-08

## 2023-02-08 RX ORDER — NADOLOL 40 MG/1
1 TABLET TABLET ORAL QHS
Qty: 90 | Refills: 3

## 2023-02-08 RX ORDER — PANTOPRAZOLE SODIUM 40 MG/1
1 TABLET TABLET, DELAYED RELEASE ORAL ONCE A DAY
Qty: 90 TABLET | Refills: 3

## 2023-02-17 ENCOUNTER — TELEPHONE ENCOUNTER (OUTPATIENT)
Dept: URBAN - METROPOLITAN AREA CLINIC 98 | Facility: CLINIC | Age: 68
End: 2023-02-17

## 2023-02-17 RX ORDER — PANTOPRAZOLE SODIUM 40 MG/1
1 TABLET TABLET, DELAYED RELEASE ORAL TWICE A DAY
Qty: 180 TABLET | Refills: 0

## 2023-04-28 ENCOUNTER — OFFICE VISIT (OUTPATIENT)
Dept: URBAN - METROPOLITAN AREA MEDICAL CENTER 28 | Facility: MEDICAL CENTER | Age: 68
End: 2023-04-28
Payer: MEDICARE

## 2023-04-28 ENCOUNTER — LAB OUTSIDE AN ENCOUNTER (OUTPATIENT)
Dept: URBAN - METROPOLITAN AREA CLINIC 98 | Facility: CLINIC | Age: 68
End: 2023-04-28

## 2023-04-28 DIAGNOSIS — K22.2 ACQUIRED ESOPHAGEAL RING: ICD-10-CM

## 2023-04-28 DIAGNOSIS — I85.10 ESOPH VARICE OTHER DIS: ICD-10-CM

## 2023-04-28 DIAGNOSIS — K31.89 ACQUIRED DEFORMITY OF DUODENUM: ICD-10-CM

## 2023-04-28 DIAGNOSIS — K74.60 ADVANCED CIRRHOSIS: ICD-10-CM

## 2023-04-28 LAB
ABSOLUTE BASOPHIL COUNT: 0.02
ABSOLUTE EOSINOPHIL COUNT: 0.08
ABSOLUTE IMMATURE GRANULOCYTE  COUNT: 0.01
ABSOLUTE LYMPHOCYTE COUNT: 0.37
ABSOLUTE MONOCYTE COUNT: 0.21
ABSOLUTE NEUTROPHIL COUNT (ANC): 1.74
ABSOLUTE NRBC  COUNT: 0
AG RATIO: 1
ALBUMIN LEVEL: 3.4
ALK PHOS: 110
ALT: 18
ANION GAP: 10
AST: 33
BASOPHIL AUTO: 1
BILIRUBIN TOTAL: 2.6
BUN/CREAT RATIO: 13
BUN: 11
CALCIUM LEVEL: 8.9
CHLORIDE LEVEL: 113
CO2 LEVEL: 21
CREATININE LEVEL: 0.8
EOS AUTO: 3
GFR 2021: >60
GLUCOSE LEVEL: 143
GLUCOSE POC: 145
HCT: 25.8
HGB: 8.4
IMMATURE GRANULOCYTES AUTO: 0.4
INR: 1.4
IPF: 1.6
LYMPH AUTO: 15
MCH: 29.6
MCHC: 32.6
MCV: 90.8
MONO AUTO: 9
MPV: 9.6
NEUTRO AUTO: 72
NRBC AUTO: 0
OSMO (CALC): 289
PERFORMING LAB: (no result)
PLATELETS: 77
POTASSIUM LEVEL: 3.7
PROTEIN TOTAL: 6.3
PT: 16.8
RBC: 2.84
RDW: 15.5
SLIDE REVIEW: (no result)
SODIUM LEVEL: 144
WBC: 2.4

## 2023-04-28 PROCEDURE — 43251 EGD REMOVE LESION SNARE: CPT | Performed by: INTERNAL MEDICINE

## 2023-04-28 PROCEDURE — 43244 EGD VARICES LIGATION: CPT | Performed by: INTERNAL MEDICINE

## 2023-04-29 LAB
AFP TUMOR MARKER: (no result)
PERFORMING LAB: (no result)

## 2023-05-01 ENCOUNTER — TELEPHONE ENCOUNTER (OUTPATIENT)
Dept: URBAN - METROPOLITAN AREA CLINIC 35 | Facility: CLINIC | Age: 68
End: 2023-05-01

## 2023-05-01 LAB
FERRITIN LEVEL: 51.2
IRON LEVEL: 60
IRON SAT: 19
PERFORMING LAB: (no result)
PERFORMING LAB: (no result)
TIBC: 320

## 2023-05-06 ENCOUNTER — TELEPHONE ENCOUNTER (OUTPATIENT)
Dept: URBAN - METROPOLITAN AREA CLINIC 35 | Facility: CLINIC | Age: 68
End: 2023-05-06

## 2023-05-08 ENCOUNTER — WEB ENCOUNTER (OUTPATIENT)
Dept: URBAN - METROPOLITAN AREA CLINIC 98 | Facility: CLINIC | Age: 68
End: 2023-05-08

## 2023-06-06 ENCOUNTER — LAB OUTSIDE AN ENCOUNTER (OUTPATIENT)
Dept: URBAN - METROPOLITAN AREA CLINIC 98 | Facility: CLINIC | Age: 68
End: 2023-06-06

## 2023-06-08 ENCOUNTER — LAB OUTSIDE AN ENCOUNTER (OUTPATIENT)
Dept: URBAN - METROPOLITAN AREA CLINIC 98 | Facility: CLINIC | Age: 68
End: 2023-06-08

## 2023-06-08 ENCOUNTER — TELEPHONE ENCOUNTER (OUTPATIENT)
Dept: URBAN - METROPOLITAN AREA CLINIC 98 | Facility: CLINIC | Age: 68
End: 2023-06-08

## 2023-06-09 ENCOUNTER — LAB OUTSIDE AN ENCOUNTER (OUTPATIENT)
Dept: URBAN - METROPOLITAN AREA CLINIC 98 | Facility: CLINIC | Age: 68
End: 2023-06-09

## 2023-06-09 ENCOUNTER — TELEPHONE ENCOUNTER (OUTPATIENT)
Dept: URBAN - METROPOLITAN AREA CLINIC 98 | Facility: CLINIC | Age: 68
End: 2023-06-09

## 2023-06-19 ENCOUNTER — TELEPHONE ENCOUNTER (OUTPATIENT)
Dept: URBAN - METROPOLITAN AREA CLINIC 98 | Facility: CLINIC | Age: 68
End: 2023-06-19

## 2023-07-28 ENCOUNTER — OFFICE VISIT (OUTPATIENT)
Dept: URBAN - METROPOLITAN AREA MEDICAL CENTER 28 | Facility: MEDICAL CENTER | Age: 68
End: 2023-07-28
Payer: MEDICARE

## 2023-07-28 ENCOUNTER — TELEPHONE ENCOUNTER (OUTPATIENT)
Dept: URBAN - METROPOLITAN AREA CLINIC 98 | Facility: CLINIC | Age: 68
End: 2023-07-28

## 2023-07-28 ENCOUNTER — LAB OUTSIDE AN ENCOUNTER (OUTPATIENT)
Dept: URBAN - METROPOLITAN AREA CLINIC 98 | Facility: CLINIC | Age: 68
End: 2023-07-28

## 2023-07-28 DIAGNOSIS — K92.2 ACUTE GASTROINTESTINAL BLEEDING: ICD-10-CM

## 2023-07-28 DIAGNOSIS — K74.60 ADVANCED CIRRHOSIS: ICD-10-CM

## 2023-07-28 DIAGNOSIS — I85.10 ESOPH VARICE OTHER DIS: ICD-10-CM

## 2023-07-28 DIAGNOSIS — K31.7 BENIGN GASTRIC POLYP: ICD-10-CM

## 2023-07-28 LAB
GLUCOSE POC: 170
PERFORMING LAB: (no result)

## 2023-07-28 PROCEDURE — 43251 EGD REMOVE LESION SNARE: CPT | Performed by: INTERNAL MEDICINE

## 2023-07-28 RX ORDER — METFORMIN HYDROCHLORIDE 500 MG/1
TAKE 2 TABLETS (1,000 MG) BY ORAL ROUTE ONCE DAILY WITH THE EVENING MEAL TABLET, EXTENDED RELEASE ORAL 1
Qty: 0 | Refills: 0 | Status: ACTIVE | COMMUNITY
Start: 1900-01-01

## 2023-07-28 RX ORDER — PANTOPRAZOLE SODIUM 40 MG
1 TABLET TABLET, DELAYED RELEASE (ENTERIC COATED) ORAL ONCE A DAY
Qty: 90 | Refills: 3 | Status: ACTIVE | COMMUNITY

## 2023-07-28 RX ORDER — PANTOPRAZOLE SODIUM 40 MG/1
1 TABLET TABLET, DELAYED RELEASE ORAL TWICE A DAY
Qty: 180 TABLET | Refills: 0 | Status: ACTIVE | COMMUNITY

## 2023-07-28 RX ORDER — LEVOTHYROXINE SODIUM 75 UG/1
TABLET ORAL
Qty: 0 | Refills: 0 | Status: ACTIVE | COMMUNITY
Start: 1900-01-01

## 2023-07-28 RX ORDER — NADOLOL 40 MG/1
1 TABLET TABLET ORAL QHS
Qty: 90 | Refills: 3 | Status: ACTIVE | COMMUNITY

## 2023-07-28 RX ORDER — SITAGLIPTIN 50 MG/1
TAKE 1 TABLET (50 MG) BY ORAL ROUTE ONCE DAILY TABLET, FILM COATED ORAL 1
Qty: 0 | Refills: 0 | Status: ACTIVE | COMMUNITY
Start: 1900-01-01

## 2023-07-28 RX ORDER — INSULIN GLARGINE 100 [IU]/ML
INJECTION, SOLUTION SUBCUTANEOUS
Qty: 0 | Refills: 0 | Status: ACTIVE | COMMUNITY
Start: 1900-01-01

## 2023-07-28 RX ORDER — GLIMEPIRIDE 2 MG/1
TAKE 2 TABLETS (4 MG) BY ORAL ROUTE ONCE DAILY TABLET ORAL 1
Qty: 0 | Refills: 0 | Status: ACTIVE | COMMUNITY
Start: 1900-01-01

## 2023-08-01 ENCOUNTER — LAB OUTSIDE AN ENCOUNTER (OUTPATIENT)
Dept: URBAN - METROPOLITAN AREA CLINIC 35 | Facility: CLINIC | Age: 68
End: 2023-08-01

## 2023-08-04 ENCOUNTER — TELEPHONE ENCOUNTER (OUTPATIENT)
Dept: URBAN - METROPOLITAN AREA CLINIC 98 | Facility: CLINIC | Age: 68
End: 2023-08-04

## 2023-10-27 ENCOUNTER — OFFICE VISIT (OUTPATIENT)
Dept: URBAN - METROPOLITAN AREA MEDICAL CENTER 28 | Facility: MEDICAL CENTER | Age: 68
End: 2023-10-27
Payer: MEDICARE

## 2023-10-27 ENCOUNTER — LAB OUTSIDE AN ENCOUNTER (OUTPATIENT)
Dept: URBAN - METROPOLITAN AREA CLINIC 98 | Facility: CLINIC | Age: 68
End: 2023-10-27

## 2023-10-27 ENCOUNTER — TELEPHONE ENCOUNTER (OUTPATIENT)
Dept: URBAN - METROPOLITAN AREA CLINIC 98 | Facility: CLINIC | Age: 68
End: 2023-10-27

## 2023-10-27 DIAGNOSIS — K74.60 ADVANCED CIRRHOSIS: ICD-10-CM

## 2023-10-27 DIAGNOSIS — K31.7 BENIGN GASTRIC POLYP: ICD-10-CM

## 2023-10-27 DIAGNOSIS — I85.10 ESOPH VARICE OTHER DIS: ICD-10-CM

## 2023-10-27 DIAGNOSIS — K31.89 ACHYLIA: ICD-10-CM

## 2023-10-27 LAB
GLUCOSE POC: 125
PERFORMING LAB: (no result)

## 2023-10-27 PROCEDURE — 43251 EGD REMOVE LESION SNARE: CPT | Performed by: INTERNAL MEDICINE

## 2023-10-27 RX ORDER — SITAGLIPTIN 50 MG/1
TAKE 1 TABLET (50 MG) BY ORAL ROUTE ONCE DAILY TABLET, FILM COATED ORAL 1
Qty: 0 | Refills: 0 | Status: ACTIVE | COMMUNITY
Start: 1900-01-01

## 2023-10-27 RX ORDER — GLIMEPIRIDE 2 MG/1
TAKE 2 TABLETS (4 MG) BY ORAL ROUTE ONCE DAILY TABLET ORAL 1
Qty: 0 | Refills: 0 | Status: ACTIVE | COMMUNITY
Start: 1900-01-01

## 2023-10-27 RX ORDER — INSULIN GLARGINE 100 [IU]/ML
INJECTION, SOLUTION SUBCUTANEOUS
Qty: 0 | Refills: 0 | Status: ACTIVE | COMMUNITY
Start: 1900-01-01

## 2023-10-27 RX ORDER — METFORMIN HYDROCHLORIDE 500 MG/1
TAKE 2 TABLETS (1,000 MG) BY ORAL ROUTE ONCE DAILY WITH THE EVENING MEAL TABLET, EXTENDED RELEASE ORAL 1
Qty: 0 | Refills: 0 | Status: ACTIVE | COMMUNITY
Start: 1900-01-01

## 2023-10-27 RX ORDER — PANTOPRAZOLE SODIUM 40 MG/1
1 TABLET TABLET, DELAYED RELEASE ORAL TWICE A DAY
Qty: 180 TABLET | Refills: 0 | Status: ACTIVE | COMMUNITY

## 2023-10-27 RX ORDER — LEVOTHYROXINE SODIUM 75 UG/1
TABLET ORAL
Qty: 0 | Refills: 0 | Status: ACTIVE | COMMUNITY
Start: 1900-01-01

## 2023-10-27 RX ORDER — PANTOPRAZOLE SODIUM 40 MG
1 TABLET TABLET, DELAYED RELEASE (ENTERIC COATED) ORAL ONCE A DAY
Qty: 90 | Refills: 3 | Status: ACTIVE | COMMUNITY

## 2023-10-27 RX ORDER — NADOLOL 40 MG/1
1 TABLET TABLET ORAL QHS
Qty: 90 | Refills: 3 | Status: ACTIVE | COMMUNITY

## 2023-10-30 ENCOUNTER — TELEPHONE ENCOUNTER (OUTPATIENT)
Dept: URBAN - METROPOLITAN AREA CLINIC 98 | Facility: CLINIC | Age: 68
End: 2023-10-30

## 2023-11-19 ENCOUNTER — ERX REFILL RESPONSE (OUTPATIENT)
Dept: URBAN - METROPOLITAN AREA CLINIC 98 | Facility: CLINIC | Age: 68
End: 2023-11-19

## 2023-11-19 RX ORDER — NADOLOL 40 MG/1
1 TABLET TABLET ORAL
Qty: 90 | Refills: 3 | OUTPATIENT

## 2023-11-19 RX ORDER — NADOLOL 40 MG/1
1 TABLET TABLET ORAL QHS
Qty: 90 | Refills: 3 | OUTPATIENT

## 2023-12-04 ENCOUNTER — LAB OUTSIDE AN ENCOUNTER (OUTPATIENT)
Dept: URBAN - METROPOLITAN AREA CLINIC 98 | Facility: CLINIC | Age: 68
End: 2023-12-04

## 2023-12-11 ENCOUNTER — LAB OUTSIDE AN ENCOUNTER (OUTPATIENT)
Dept: URBAN - METROPOLITAN AREA CLINIC 98 | Facility: CLINIC | Age: 68
End: 2023-12-11

## 2023-12-11 ENCOUNTER — TELEPHONE ENCOUNTER (OUTPATIENT)
Dept: URBAN - METROPOLITAN AREA CLINIC 98 | Facility: CLINIC | Age: 68
End: 2023-12-11

## 2023-12-12 ENCOUNTER — WEB ENCOUNTER (OUTPATIENT)
Dept: URBAN - METROPOLITAN AREA CLINIC 98 | Facility: CLINIC | Age: 68
End: 2023-12-12

## 2024-01-01 ENCOUNTER — LAB OUTSIDE AN ENCOUNTER (OUTPATIENT)
Dept: URBAN - METROPOLITAN AREA CLINIC 98 | Facility: CLINIC | Age: 69
End: 2024-01-01

## 2024-01-01 LAB
ABSOLUTE BASOPHIL COUNT: 0.03
ABSOLUTE EOSINOPHIL COUNT: 0.08
ABSOLUTE IMMATURE GRANULOCYTE  COUNT: 0.33
ABSOLUTE LYMPHOCYTE COUNT: 0.4
ABSOLUTE MONOCYTE COUNT: 0.54
ABSOLUTE NEUTROPHIL COUNT (ANC): 6
ABSOLUTE NRBC  COUNT: 0
BASOPHIL AUTO: 0
EOS AUTO: 1
FERRITIN LEVEL: 308.5
GLUCOSE POC: 101
HCT: 22
HGB: 7.1
IMMATURE GRANULOCYTES AUTO: 4.5
IPF: 0.9
IRON LEVEL: 99
IRON SAT: 41
LYMPH AUTO: 5
MCH: 31.8
MCHC: 32.3
MCV: 98.7
MONO AUTO: 7
MPV: 9.4
NEUTRO AUTO: 81
NRBC AUTO: 0
PERFORMING LAB: (no result)
PLATELETS: 93
RBC: 2.23
RDW: 16.9
SLIDE REVIEW: (no result)
TIBC: 240
WBC: 7.4

## 2024-01-21 ENCOUNTER — ERX REFILL RESPONSE (OUTPATIENT)
Dept: URBAN - METROPOLITAN AREA CLINIC 98 | Facility: CLINIC | Age: 69
End: 2024-01-21

## 2024-01-21 RX ORDER — PANTOPRAZOLE SODIUM 40 MG/1
TAKE 1 TABLET BY MOUTH TWICE  DAILY TABLET, DELAYED RELEASE ORAL
Qty: 180 TABLET | Refills: 3 | OUTPATIENT

## 2024-01-21 RX ORDER — PANTOPRAZOLE SODIUM 40 MG/1
TAKE 1 TABLET BY MOUTH TWICE  DAILY TABLET, DELAYED RELEASE ORAL
Qty: 180 TABLET | Refills: 4 | OUTPATIENT

## 2024-01-23 ENCOUNTER — TELEPHONE ENCOUNTER (OUTPATIENT)
Dept: URBAN - METROPOLITAN AREA CLINIC 98 | Facility: CLINIC | Age: 69
End: 2024-01-23

## 2024-01-23 RX ORDER — NADOLOL 40 MG/1
1 TABLET TABLET ORAL
Qty: 90 | Refills: 3

## 2024-01-23 RX ORDER — PANTOPRAZOLE SODIUM 40 MG/1
TAKE 1 TABLET BY MOUTH TWICE  DAILY TABLET, DELAYED RELEASE ORAL
Qty: 180 TABLET | Refills: 3

## 2024-02-05 ENCOUNTER — LAB (OUTPATIENT)
Dept: URBAN - METROPOLITAN AREA CLINIC 98 | Facility: CLINIC | Age: 69
End: 2024-02-05

## 2024-02-27 ENCOUNTER — LAB (OUTPATIENT)
Dept: URBAN - METROPOLITAN AREA CLINIC 98 | Facility: CLINIC | Age: 69
End: 2024-02-27

## 2024-03-07 ENCOUNTER — EGD (OUTPATIENT)
Dept: URBAN - METROPOLITAN AREA MEDICAL CENTER 28 | Facility: MEDICAL CENTER | Age: 69
End: 2024-03-07
Payer: MEDICARE

## 2024-03-07 DIAGNOSIS — K74.60 CIRRHOSIS: ICD-10-CM

## 2024-03-07 DIAGNOSIS — I85.10 ESOPHAGEAL VARICES: ICD-10-CM

## 2024-03-07 DIAGNOSIS — K31.89 OTHER DISEASES OF STOMACH AND DUODENUM: ICD-10-CM

## 2024-03-07 DIAGNOSIS — D50.9 ANEMIA, IRON DEFICIENCY: ICD-10-CM

## 2024-03-07 PROCEDURE — 43235 EGD DIAGNOSTIC BRUSH WASH: CPT | Performed by: INTERNAL MEDICINE

## 2024-05-10 ENCOUNTER — WEB ENCOUNTER (OUTPATIENT)
Dept: URBAN - METROPOLITAN AREA CLINIC 98 | Facility: CLINIC | Age: 69
End: 2024-05-10

## 2024-05-13 ENCOUNTER — WEB ENCOUNTER (OUTPATIENT)
Dept: URBAN - METROPOLITAN AREA CLINIC 98 | Facility: CLINIC | Age: 69
End: 2024-05-13

## 2024-05-29 ENCOUNTER — OFFICE VISIT (OUTPATIENT)
Dept: URBAN - METROPOLITAN AREA CLINIC 98 | Facility: CLINIC | Age: 69
End: 2024-05-29

## 2024-05-29 VITALS
BODY MASS INDEX: 39.55 KG/M2 | DIASTOLIC BLOOD PRESSURE: 59 MMHG | TEMPERATURE: 97 F | WEIGHT: 252 LBS | HEART RATE: 76 BPM | HEIGHT: 67 IN | SYSTOLIC BLOOD PRESSURE: 108 MMHG

## 2024-05-29 PROBLEM — 14223005: Status: ACTIVE | Noted: 2024-05-29

## 2024-05-29 PROBLEM — 389026000: Status: ACTIVE | Noted: 2024-05-29

## 2024-05-29 RX ORDER — PANTOPRAZOLE SODIUM 40 MG
1 TABLET TABLET, DELAYED RELEASE (ENTERIC COATED) ORAL TWICE A DAY
Qty: 180 TABLET | Refills: 3 | Status: ACTIVE | COMMUNITY

## 2024-05-29 RX ORDER — NADOLOL 40 MG/1
1 TABLET TABLET ORAL
Qty: 90 | Refills: 3 | Status: ACTIVE | COMMUNITY

## 2024-05-29 RX ORDER — GLIMEPIRIDE 2 MG/1
TAKE 2 TABLETS (4 MG) BY ORAL ROUTE ONCE DAILY TABLET ORAL 1
Qty: 0 | Refills: 0 | Status: ACTIVE | COMMUNITY
Start: 1900-01-01

## 2024-05-29 RX ORDER — SITAGLIPTIN 50 MG/1
TAKE 1 TABLET (50 MG) BY ORAL ROUTE ONCE DAILY TABLET, FILM COATED ORAL 1
Qty: 0 | Refills: 0 | Status: ACTIVE | COMMUNITY
Start: 1900-01-01

## 2024-05-29 RX ORDER — METFORMIN HYDROCHLORIDE 500 MG/1
TAKE 2 TABLETS (1,000 MG) BY ORAL ROUTE ONCE DAILY WITH THE EVENING MEAL TABLET, EXTENDED RELEASE ORAL 1
Qty: 0 | Refills: 0 | Status: ACTIVE | COMMUNITY
Start: 1900-01-01

## 2024-05-29 RX ORDER — INSULIN GLARGINE 100 [IU]/ML
INJECTION, SOLUTION SUBCUTANEOUS
Qty: 0 | Refills: 0 | Status: ACTIVE | COMMUNITY
Start: 1900-01-01

## 2024-05-29 RX ORDER — PANTOPRAZOLE SODIUM 40 MG/1
TAKE 1 TABLET BY MOUTH TWICE  DAILY TABLET, DELAYED RELEASE ORAL
Qty: 180 TABLET | Refills: 3 | Status: ACTIVE | COMMUNITY

## 2024-05-29 RX ORDER — LEVOTHYROXINE SODIUM 75 UG/1
TABLET ORAL
Qty: 0 | Refills: 0 | Status: ACTIVE | COMMUNITY
Start: 1900-01-01

## 2024-05-29 RX ORDER — SPIRONOLACTONE 100 MG/1
1 TABLET TABLET, FILM COATED ORAL ONCE A DAY
Status: ACTIVE | COMMUNITY

## 2024-05-30 ENCOUNTER — TELEPHONE ENCOUNTER (OUTPATIENT)
Dept: URBAN - METROPOLITAN AREA CLINIC 98 | Facility: CLINIC | Age: 69
End: 2024-05-30

## 2024-05-30 RX ORDER — SPIRONOLACTONE 100 MG/1
1 TABLET TABLET, FILM COATED ORAL ONCE A DAY
Qty: 90 TABLET | Refills: 1

## 2024-06-03 ENCOUNTER — LAB OUTSIDE AN ENCOUNTER (OUTPATIENT)
Dept: URBAN - METROPOLITAN AREA CLINIC 98 | Facility: CLINIC | Age: 69
End: 2024-06-03

## 2024-06-05 ENCOUNTER — LAB OUTSIDE AN ENCOUNTER (OUTPATIENT)
Dept: URBAN - METROPOLITAN AREA CLINIC 98 | Facility: CLINIC | Age: 69
End: 2024-06-05

## 2024-06-06 ENCOUNTER — LAB OUTSIDE AN ENCOUNTER (OUTPATIENT)
Dept: URBAN - METROPOLITAN AREA CLINIC 98 | Facility: CLINIC | Age: 69
End: 2024-06-06

## 2024-06-10 ENCOUNTER — TELEPHONE ENCOUNTER (OUTPATIENT)
Dept: URBAN - METROPOLITAN AREA CLINIC 98 | Facility: CLINIC | Age: 69
End: 2024-06-10

## 2024-06-10 ENCOUNTER — LAB OUTSIDE AN ENCOUNTER (OUTPATIENT)
Dept: URBAN - METROPOLITAN AREA CLINIC 98 | Facility: CLINIC | Age: 69
End: 2024-06-10

## 2024-06-11 ENCOUNTER — TELEPHONE ENCOUNTER (OUTPATIENT)
Dept: URBAN - METROPOLITAN AREA CLINIC 98 | Facility: CLINIC | Age: 69
End: 2024-06-11

## 2024-06-11 ENCOUNTER — LAB OUTSIDE AN ENCOUNTER (OUTPATIENT)
Dept: URBAN - METROPOLITAN AREA CLINIC 98 | Facility: CLINIC | Age: 69
End: 2024-06-11

## 2024-06-11 PROBLEM — 34742003: Status: ACTIVE | Noted: 2024-06-11

## 2024-06-11 LAB
ABSOLUTE BASOPHIL COUNT: 0.03
ABSOLUTE EOSINOPHIL COUNT: 0.09
ABSOLUTE IMMATURE GRANULOCYTE  COUNT: 0.07
ABSOLUTE LYMPHOCYTE COUNT: 0.35
ABSOLUTE MONOCYTE COUNT: 0.4
ABSOLUTE NEUTROPHIL COUNT (ANC): 3.27
ABSOLUTE NRBC  COUNT: 0
BASOPHIL AUTO: 1
EOS AUTO: 2
HCT: 25.7
HGB: 8.4
IMMATURE GRANULOCYTES AUTO: 1.7
INR: 1.4
IPF: 1.6
LYMPH AUTO: 8
MCH: 31.2
MCHC: 32.7
MCV: 95.5
MONO AUTO: 10
MPV: 9.9
NEUTRO AUTO: 78
NRBC AUTO: 0
PARTIAL THROMBOPLASTIN TIME: 30.8
PERFORMING LAB: (no result)
PLATELETS: 96
PT: 16.7
RBC: 2.69
RDW: 16.7
SLIDE REVIEW: (no result)
WBC: 4.2

## 2024-06-12 ENCOUNTER — OFFICE VISIT (OUTPATIENT)
Dept: URBAN - METROPOLITAN AREA CLINIC 98 | Facility: CLINIC | Age: 69
End: 2024-06-12
Payer: MEDICARE

## 2024-06-12 ENCOUNTER — LAB OUTSIDE AN ENCOUNTER (OUTPATIENT)
Dept: URBAN - METROPOLITAN AREA CLINIC 98 | Facility: CLINIC | Age: 69
End: 2024-06-12

## 2024-06-12 VITALS
SYSTOLIC BLOOD PRESSURE: 125 MMHG | TEMPERATURE: 97 F | HEART RATE: 76 BPM | DIASTOLIC BLOOD PRESSURE: 71 MMHG | HEIGHT: 67 IN | WEIGHT: 243 LBS | BODY MASS INDEX: 38.14 KG/M2

## 2024-06-12 DIAGNOSIS — I85.10 SECONDARY ESOPHAGEAL VARICES WITHOUT BLEEDING: ICD-10-CM

## 2024-06-12 DIAGNOSIS — K75.81 NASH WITH CIRRHOSIS: ICD-10-CM

## 2024-06-12 DIAGNOSIS — R18.8 OTHER ASCITES: ICD-10-CM

## 2024-06-12 DIAGNOSIS — K76.6 PORTAL HYPERTENSION: ICD-10-CM

## 2024-06-12 PROCEDURE — 99214 OFFICE O/P EST MOD 30 MIN: CPT | Performed by: INTERNAL MEDICINE

## 2024-06-12 NOTE — HPI-TODAY'S VISIT:
Here to discuss recent decompensation of liver disease  still requiring paracentesis and still having ascites now  no difficulty breathing  . developed ascites last month and was hospitalized at [facility] 6/2024      IMPRESSION: Cirrhotic morphology of the liver with sequela of portal        hypertension with moderate to large-volume ascites and at least mild        splenomegaly. Dilated portal vein.         Excessive breathing motion identified significantly limiting the        diagnostic capability of the study. Within the constraints of the        study, no arterial-phase enhancing hepatic lesion is identified.

## 2024-06-17 ENCOUNTER — TELEPHONE ENCOUNTER (OUTPATIENT)
Dept: URBAN - METROPOLITAN AREA CLINIC 98 | Facility: CLINIC | Age: 69
End: 2024-06-17

## 2024-06-18 ENCOUNTER — OFFICE VISIT (OUTPATIENT)
Dept: URBAN - METROPOLITAN AREA TELEHEALTH 2 | Facility: TELEHEALTH | Age: 69
End: 2024-06-18

## 2024-06-18 ENCOUNTER — TELEPHONE ENCOUNTER (OUTPATIENT)
Dept: URBAN - METROPOLITAN AREA CLINIC 98 | Facility: CLINIC | Age: 69
End: 2024-06-18

## 2024-06-18 VITALS — WEIGHT: 248 LBS | BODY MASS INDEX: 38.92 KG/M2 | HEIGHT: 67 IN

## 2024-06-18 NOTE — HPI-TODAY'S VISIT:
Visit 6/23/2020- Dr. Morse Here to f/u: routine cirrhosis faxed labs - still pending my review blood counts are pretty low  [place] concerned that she is needing to get more iron infusions on a more frequent basis  Hb 9.2 no overt bleeding  Visit 5/29/24- Marleny Tariq NP - Here to follow-up after hospitalization for non-alcoholic cirrhosis - Friend Yaneth present for office visit today - Receiving iron infusions every 2-3 months; managed by Dr. Omalley hematology/oncology - Hospitalized 5/1-5/4 [facility] for volume overload secondary to - non-alcoholic cirrhosis - Paracentesis 5/2/24- 9 Liter removed - Labs 5/22/24- afp 3.3, inr 1.2, pt 12.3, ferritin 459, iron sat 34, hgb 10.9, mcv 98, plt 107, cr 1.55, na 143, k 4.7, albumin 3.4, tbil 2.2, alp 144, ast 30, alt 17, gfr 36 - Taking furosemide 40 mg daily and spironolactone 100 mg daily - ECHO 5/2/24- EF 57%  6/12/24- Dr. Morse Here to move up her post hospital follow up developed ascites last month and was hospitalized at [facility] MRI 6/2024 IMPRESSION: Cirrhotic morphology of the liver with sequela of portal hypertension with moderate to large-volume ascites and at least mild splenomegaly. Dilated portal vein. Excessive breathing motion identified significantly limiting the diagnostic capability of the study. Within the constraints of the study, no arterial-phase enhancing hepatic lesion is identified.  6/18/24- Marleny Tariq NP - Present for TH visit decompensated liver cirrhosis - OV with Dr. Morse 6/12/24- discussed with patient cirrhosis dx.  - Last paracentesis on 6/11- drained - Labs - - Dr Morse ordered repeat paracentesis for patient last visit - Some SOB, very fatigued - Taking spironolactone 100 qd and nadolol 40 mg qd - Labs 6/12- inr 1.1, mag 1.5, hgb 9.1, mcv 98, plt 105, cr 1.3, gfr 44, albumin 3.5, tbil 25, alp 135, ast 40, alt 21, ferritin 269

## 2024-06-18 NOTE — HPI-OTHER HISTORIES
12/11/2023 MRI LIVER WITHOUT AND WITH CONTRAST:         CLINICAL HISTORY: Liver cirrhosis.         TECHNIQUE: MRI of the abdomen with attention to the liver was performed        using a variety of multiplanar pulse sequences before and after        administration of 10 mL of Gadavist gadolinium contrast.         COMPARISON: There are several previous studies available most recently        available from 12/12/2022.         FINDINGS:        Lung bases: The included lung bases are clear.        Liver: There are nodular findings of liver cirrhosis without focal        enhancing arterial phase mass. Right liver cyst measures 4 mm. The        portal vein is mildly distended and shows no findings for thrombus.        Gallbladder and biliary system: Gallbladder has likely been removed.        Common bile duct measures up to 3 mm.        Spleen: Spleen measures up to 18 cm. No focal mass. Splenic vein is        also distended significantly. No splenic vein thrombus.        Pancreas: Normal. No pancreatic mass. No pancreatic duct dilatation. No        peripancreatic fat stranding or fluid. There is no pancreas divisum.        Adrenal glands: Normal. No focal nodule.        Kidneys and Collecting System: There is no hydronephrosis of either        kidney. Lateral mid septated right renal cyst is stable measuring 1.9        cm.        Gastrointestinal tract: The visualized bowel in the upper abdomen is        normal in appearance without obstruction.        Peritoneum/Retroperitoneum: Trace right upper quadrant perihepatic        ascites.        Abdominal wall: Normal. No abdominal wall hernia or fluid collection.        Vascular: The visualized abdominal aorta and its major branches are        normal. No aneurysm.        Musculoskeletal: The bones are normal in marrow signal without evidence        of edema.         IMPRESSION: Sequela of hepatic cirrhosis and portal hypertension        without focal concerning hepatic mass. Stable right liver cyst.         At least moderate splenomegaly with splenic vein distention also        consistent with portal hypertension.         Septated, but typically benign right lateral mid renal cyst. Attention        at follow-up recommended.         Trace upper abdominal perihepatic ascites. 3/7/2024 EGD- DR. LOPEZ - Small < 5 mm esophageal varices - Single gastric polyp - Normal stomach mucosa - Normal duodenum - No specimens collected - Recommend surveillance repeat in 6 months

## 2024-06-26 ENCOUNTER — LAB OUTSIDE AN ENCOUNTER (OUTPATIENT)
Dept: URBAN - METROPOLITAN AREA TELEHEALTH 2 | Facility: TELEHEALTH | Age: 69
End: 2024-06-26

## 2024-06-27 ENCOUNTER — LAB OUTSIDE AN ENCOUNTER (OUTPATIENT)
Dept: URBAN - METROPOLITAN AREA CLINIC 98 | Facility: CLINIC | Age: 69
End: 2024-06-27

## 2024-06-27 LAB
GLUCOSE POC: 178
PERFORMING LAB: (no result)

## 2024-07-01 ENCOUNTER — LAB OUTSIDE AN ENCOUNTER (OUTPATIENT)
Dept: URBAN - METROPOLITAN AREA CLINIC 98 | Facility: CLINIC | Age: 69
End: 2024-07-01

## 2024-07-01 ENCOUNTER — TELEPHONE ENCOUNTER (OUTPATIENT)
Dept: URBAN - METROPOLITAN AREA CLINIC 98 | Facility: CLINIC | Age: 69
End: 2024-07-01

## 2024-07-09 ENCOUNTER — LAB OUTSIDE AN ENCOUNTER (OUTPATIENT)
Dept: URBAN - METROPOLITAN AREA TELEHEALTH 2 | Facility: TELEHEALTH | Age: 69
End: 2024-07-09

## 2024-07-11 ENCOUNTER — LAB OUTSIDE AN ENCOUNTER (OUTPATIENT)
Dept: URBAN - METROPOLITAN AREA TELEHEALTH 2 | Facility: TELEHEALTH | Age: 69
End: 2024-07-11

## 2024-07-11 ENCOUNTER — OFFICE VISIT (OUTPATIENT)
Dept: URBAN - METROPOLITAN AREA TELEHEALTH 2 | Facility: TELEHEALTH | Age: 69
End: 2024-07-11
Payer: MEDICARE

## 2024-07-11 VITALS — HEIGHT: 67 IN | WEIGHT: 248 LBS | BODY MASS INDEX: 38.92 KG/M2

## 2024-07-11 DIAGNOSIS — I85.10 SECONDARY ESOPHAGEAL VARICES WITHOUT BLEEDING: ICD-10-CM

## 2024-07-11 DIAGNOSIS — K75.81 NASH WITH CIRRHOSIS: ICD-10-CM

## 2024-07-11 DIAGNOSIS — R18.8 OTHER ASCITES: ICD-10-CM

## 2024-07-11 DIAGNOSIS — K76.6 PORTAL HYPERTENSION: ICD-10-CM

## 2024-07-11 PROCEDURE — 99214 OFFICE O/P EST MOD 30 MIN: CPT

## 2024-07-11 NOTE — HPI-TODAY'S VISIT:
Visit 6/23/2020- Dr. Morse Here to f/u: routine cirrhosis faxed labs - still pending my review blood counts are pretty low  [place] concerned that she is needing to get more iron infusions on a more frequent basis  Hb 9.2 no overt bleeding  Visit 5/29/24- Marlney Tariq NP - Here to follow-up after hospitalization for non-alcoholic cirrhosis - Friend Yaneth present for office visit today - Receiving iron infusions every 2-3 months; managed by Dr. Omalley hematology/oncology - Hospitalized 5/1-5/4 [facility] for volume overload secondary to - non-alcoholic cirrhosis - Paracentesis 5/2/24- 9 Liter removed - Labs 5/22/24- afp 3.3, inr 1.2, pt 12.3, ferritin 459, iron sat 34, hgb 10.9, mcv 98, plt 107, cr 1.55, na 143, k 4.7, albumin 3.4, tbil 2.2, alp 144, ast 30, alt 17, gfr 36 - Taking furosemide 40 mg daily and spironolactone 100 mg daily - ECHO 5/2/24- EF 57%  6/12/24- Dr. Morse Here to move up her post hospital follow up developed ascites last month and was hospitalized at [facility] MRI 6/2024 IMPRESSION: Cirrhotic morphology of the liver with sequela of portal hypertension with moderate to large-volume ascites and at least mild splenomegaly. Dilated portal vein. Excessive breathing motion identified significantly limiting the diagnostic capability of the study. Within the constraints of the study, no arterial-phase enhancing hepatic lesion is identified.  6/18/24- Marleny Tariq NP - Present for TH visit decompensated liver cirrhosis - OV with Dr. Morse 6/12/24- discussed with patient cirrhosis dx.  - Last paracentesis on 6/11- drained - Dr Morse ordered repeat paracentesis for patient last visit - Some SOB, very fatigued - Taking spironolactone 100 qd and nadolol 40 mg qd - Labs 6/12- inr 1.1, mag 1.5, hgb 9.1, mcv 98, plt 105, cr 1.3, gfr 44, albumin 3.5, tbil 25, alp 135, ast 40, alt 21, ferritin 269  7/11/2024- Marleny Tariq NP - Here to Follow-up Th decompensated cirrhosis - Unable to connect to TH video- per patient. - Paracentesis 6/27- drained 5.6 L - Scheduled for another paracentesis tomorrow 7/12 - Labs 7/10/24- hgb 8.3, plt 106, cr 1.36, gfr 42, k 5.4, albumin 3.5, Tbil 1.7, alk phos 133, ast 42, alt 27, inr 1.1, mag 1.7 - has SOB and increased fatigue when due for paracentesis - Current medication: januvea 100, metformin 500 bid, emeral, 200, 40 unit lantus, levothyroxin, protonix 40 bid, nadolol 40 qd, spironolactone 100 qd

## 2024-07-12 ENCOUNTER — LAB OUTSIDE AN ENCOUNTER (OUTPATIENT)
Dept: URBAN - METROPOLITAN AREA CLINIC 98 | Facility: CLINIC | Age: 69
End: 2024-07-12

## 2024-07-12 LAB
BODY FLUID APPEARANCE: (no result)
BODY FLUID COLOR: YELLOW
BODY FLUID DIFF:: (no result)
BODY FLUID LYMPH: 59
BODY FLUID MONO/MACRO: 31
BODY FLUID NEUT: 10
BODY FLUID NUCLEATED CELLS: 351
BODY FLUID RBC: 10
BODY FLUID TOTAL CELLS COUNTED: 100
BODY FLUID TYPE: (no result)
PERFORMING LAB: (no result)

## 2024-07-18 ENCOUNTER — LAB OUTSIDE AN ENCOUNTER (OUTPATIENT)
Dept: URBAN - METROPOLITAN AREA TELEHEALTH 2 | Facility: TELEHEALTH | Age: 69
End: 2024-07-18

## 2024-07-23 ENCOUNTER — WEB ENCOUNTER (OUTPATIENT)
Dept: URBAN - METROPOLITAN AREA CLINIC 98 | Facility: CLINIC | Age: 69
End: 2024-07-23

## 2024-07-25 ENCOUNTER — LAB OUTSIDE AN ENCOUNTER (OUTPATIENT)
Dept: URBAN - METROPOLITAN AREA CLINIC 98 | Facility: CLINIC | Age: 69
End: 2024-07-25

## 2024-07-28 LAB
BODY FLUID APPEARANCE: (no result)
BODY FLUID COLOR: YELLOW
BODY FLUID DIFF:: (no result)
BODY FLUID LYMPH: 76
BODY FLUID MONO/MACRO: 9
BODY FLUID NEUT: 15
BODY FLUID NUCLEATED CELLS: 248
BODY FLUID RBC: 83
BODY FLUID TOTAL CELLS COUNTED: 100
BODY FLUID TYPE: (no result)
PERFORMING LAB: (no result)

## 2024-07-29 ENCOUNTER — TELEPHONE ENCOUNTER (OUTPATIENT)
Dept: URBAN - METROPOLITAN AREA CLINIC 98 | Facility: CLINIC | Age: 69
End: 2024-07-29

## 2024-07-30 ENCOUNTER — LAB OUTSIDE AN ENCOUNTER (OUTPATIENT)
Dept: URBAN - METROPOLITAN AREA CLINIC 98 | Facility: CLINIC | Age: 69
End: 2024-07-30

## 2024-08-01 ENCOUNTER — LAB OUTSIDE AN ENCOUNTER (OUTPATIENT)
Dept: URBAN - METROPOLITAN AREA CLINIC 98 | Facility: CLINIC | Age: 69
End: 2024-08-01

## 2024-08-01 LAB
ABSOLUTE BASOPHIL COUNT: 0.03
ABSOLUTE EOSINOPHIL COUNT: 0.12
ABSOLUTE IMMATURE GRANULOCYTE  COUNT: 0.05
ABSOLUTE LYMPHOCYTE COUNT: 0.34
ABSOLUTE MONOCYTE COUNT: 0.38
ABSOLUTE NEUTROPHIL COUNT (ANC): 3.47
ABSOLUTE NRBC  COUNT: 0
ANISOCYTOSIS: SLIGHT
BASOPHIL AUTO: 1
BURR CELLS: SLIGHT
ELLIPTOCYTE: SLIGHT
EOS AUTO: 3
GLUCOSE POC: 197
HCT: 22
HGB: 6.9
HYPOCHROMIA: SLIGHT
IMMATURE GRANULOCYTES AUTO: 1.1
INR: 1.3
IPF: 1
LYMPH AUTO: 8
MCH: 31.2
MCHC: 31.4
MCV: 99.5
MONO AUTO: 9
MPV: 9.7
NEUTRO AUTO: 79
NRBC AUTO: 0
PARTIAL THROMBOPLASTIN TIME: 32.1
PERFORMING LAB: (no result)
PLATELETS: 99
PLT ESTIMATION: (no result)
POIK: SLIGHT
POLYCHROM: SLIGHT
PT: 16.3
RBC MORPH: (no result)
RBC: 2.21
RDW: 15.4
SLIDE REVIEW: (no result)
STOMATOCYTOSIS: SLIGHT
WBC: 4.4

## 2024-08-02 ENCOUNTER — TELEPHONE ENCOUNTER (OUTPATIENT)
Dept: URBAN - METROPOLITAN AREA CLINIC 98 | Facility: CLINIC | Age: 69
End: 2024-08-02

## 2024-08-02 ENCOUNTER — LAB OUTSIDE AN ENCOUNTER (OUTPATIENT)
Dept: URBAN - METROPOLITAN AREA CLINIC 98 | Facility: CLINIC | Age: 69
End: 2024-08-02

## 2024-08-05 ENCOUNTER — LAB OUTSIDE AN ENCOUNTER (OUTPATIENT)
Dept: URBAN - METROPOLITAN AREA CLINIC 98 | Facility: CLINIC | Age: 69
End: 2024-08-05

## 2024-08-09 ENCOUNTER — LAB OUTSIDE AN ENCOUNTER (OUTPATIENT)
Dept: URBAN - METROPOLITAN AREA CLINIC 98 | Facility: CLINIC | Age: 69
End: 2024-08-09

## 2024-08-09 LAB
BODY FLUID APPEARANCE: CLEAR
BODY FLUID COLOR: YELLOW
BODY FLUID DIFF:: (no result)
BODY FLUID LYMPH: 42
BODY FLUID MONO/MACRO: 20
BODY FLUID NEUT: 22
BODY FLUID NUCLEATED CELLS: 199
BODY FLUID RBC: <2000
BODY FLUID TOTAL CELLS COUNTED: 100
BODY FLUID TYPE: (no result)
GLUCOSE POC: 172
Lab: 16
PERFORMING LAB: (no result)
PERFORMING LAB: (no result)

## 2024-08-12 ENCOUNTER — TELEPHONE ENCOUNTER (OUTPATIENT)
Dept: URBAN - METROPOLITAN AREA CLINIC 98 | Facility: CLINIC | Age: 69
End: 2024-08-12

## 2024-08-14 ENCOUNTER — LAB OUTSIDE AN ENCOUNTER (OUTPATIENT)
Dept: URBAN - METROPOLITAN AREA CLINIC 98 | Facility: CLINIC | Age: 69
End: 2024-08-14

## 2024-08-19 ENCOUNTER — TELEPHONE ENCOUNTER (OUTPATIENT)
Dept: URBAN - METROPOLITAN AREA CLINIC 98 | Facility: CLINIC | Age: 69
End: 2024-08-19

## 2024-08-21 ENCOUNTER — LAB OUTSIDE AN ENCOUNTER (OUTPATIENT)
Dept: URBAN - METROPOLITAN AREA CLINIC 98 | Facility: CLINIC | Age: 69
End: 2024-08-21

## 2024-08-23 ENCOUNTER — LAB OUTSIDE AN ENCOUNTER (OUTPATIENT)
Dept: URBAN - METROPOLITAN AREA CLINIC 98 | Facility: CLINIC | Age: 69
End: 2024-08-23

## 2024-08-23 ENCOUNTER — OFFICE VISIT (OUTPATIENT)
Dept: URBAN - METROPOLITAN AREA MEDICAL CENTER 28 | Facility: MEDICAL CENTER | Age: 69
End: 2024-08-23
Payer: MEDICARE

## 2024-08-23 ENCOUNTER — TELEPHONE ENCOUNTER (OUTPATIENT)
Dept: URBAN - METROPOLITAN AREA CLINIC 98 | Facility: CLINIC | Age: 69
End: 2024-08-23

## 2024-08-23 DIAGNOSIS — I85.10 SECONDARY ESOPHAGEAL VARICES WITHOUT BLEEDING: ICD-10-CM

## 2024-08-23 DIAGNOSIS — K74.60 CIRRHOSIS: ICD-10-CM

## 2024-08-23 DIAGNOSIS — K31.89 OTHER DISEASES OF STOMACH AND DUODENUM: ICD-10-CM

## 2024-08-23 LAB
ABSOLUTE NRBC COUNT: 0.02
AG RATIO: 2
ALBUMIN LEVEL: 3.1
ALK PHOS: 106
ALT: 15
ANION GAP: 6
AST: 35
BILIRUBIN TOTAL: 2.2
BUN/CREAT RATIO: 26
BUN: 43
CALCIUM LEVEL: 8.3
CHLORIDE LEVEL: 114
CO2 LEVEL: 16
CREATININE LEVEL: 1.7
FERRITIN LEVEL: 431
GFR 2021: 32
GLUCOSE LEVEL: 195
GLUCOSE POC: 186
HCT: 23.2
HGB: 7.5
INR: 1.4
IPF: 1.2
IRON LEVEL: 40
IRON SAT: 17
MCH: 31.8
MCHC: 32.3
MCV: 98.3
MPV: 10.1
NRBC AUTO: 0
OSMO (CALC): 288
PERFORMING LAB: (no result)
PLATELETS: 73
POTASSIUM LEVEL: 4.9
PROTEIN TOTAL: 5.1
PT: 17.1
RBC: 2.36
RDW: 17.7
SODIUM LEVEL: 136
TIBC: 241
WBC: 5.8

## 2024-08-23 PROCEDURE — 43244 EGD VARICES LIGATION: CPT | Performed by: INTERNAL MEDICINE

## 2024-08-25 ENCOUNTER — TELEPHONE ENCOUNTER (OUTPATIENT)
Dept: URBAN - METROPOLITAN AREA CLINIC 98 | Facility: CLINIC | Age: 69
End: 2024-08-25

## 2024-08-26 ENCOUNTER — LAB OUTSIDE AN ENCOUNTER (OUTPATIENT)
Dept: URBAN - METROPOLITAN AREA CLINIC 98 | Facility: CLINIC | Age: 69
End: 2024-08-26

## 2024-08-26 LAB
BODY FLUID APPEARANCE: CLEAR
BODY FLUID COLOR: YELLOW
BODY FLUID DIFF:: (no result)
BODY FLUID LYMPH: 27
BODY FLUID MESO: 1
BODY FLUID MONO/MACRO: 27
BODY FLUID NEUT: 45
BODY FLUID NUCLEATED CELLS: 436
BODY FLUID RBC: <2000
BODY FLUID TOTAL CELLS COUNTED: 100
BODY FLUID TYPE: (no result)
PERFORMING LAB: (no result)

## 2024-08-27 ENCOUNTER — TELEPHONE ENCOUNTER (OUTPATIENT)
Dept: URBAN - METROPOLITAN AREA CLINIC 98 | Facility: CLINIC | Age: 69
End: 2024-08-27

## 2024-08-27 RX ORDER — CIPROFLOXACIN HYDROCHLORIDE 500 MG/1
1 TABLET TABLET, FILM COATED ORAL
Qty: 14 TABLET | Refills: 0 | OUTPATIENT
Start: 2024-08-27 | End: 2024-09-03

## 2024-08-29 ENCOUNTER — TELEPHONE ENCOUNTER (OUTPATIENT)
Dept: URBAN - METROPOLITAN AREA CLINIC 98 | Facility: CLINIC | Age: 69
End: 2024-08-29

## 2024-09-13 ENCOUNTER — TELEPHONE ENCOUNTER (OUTPATIENT)
Dept: URBAN - METROPOLITAN AREA CLINIC 23 | Facility: CLINIC | Age: 69
End: 2024-09-13

## 2024-09-17 ENCOUNTER — LAB OUTSIDE AN ENCOUNTER (OUTPATIENT)
Dept: URBAN - METROPOLITAN AREA CLINIC 98 | Facility: CLINIC | Age: 69
End: 2024-09-17

## 2024-09-24 ENCOUNTER — LAB OUTSIDE AN ENCOUNTER (OUTPATIENT)
Dept: URBAN - METROPOLITAN AREA CLINIC 98 | Facility: CLINIC | Age: 69
End: 2024-09-24

## 2024-09-30 ENCOUNTER — OFFICE VISIT (OUTPATIENT)
Dept: URBAN - METROPOLITAN AREA TELEHEALTH 2 | Facility: TELEHEALTH | Age: 69
End: 2024-09-30